# Patient Record
Sex: FEMALE | Race: WHITE | NOT HISPANIC OR LATINO | Employment: OTHER | URBAN - METROPOLITAN AREA
[De-identification: names, ages, dates, MRNs, and addresses within clinical notes are randomized per-mention and may not be internally consistent; named-entity substitution may affect disease eponyms.]

---

## 2023-12-14 ENCOUNTER — OFFICE VISIT (OUTPATIENT)
Dept: URGENT CARE | Facility: URGENT CARE | Age: 78
End: 2023-12-14
Payer: MEDICARE

## 2023-12-14 VITALS
DIASTOLIC BLOOD PRESSURE: 64 MMHG | WEIGHT: 113.6 LBS | SYSTOLIC BLOOD PRESSURE: 110 MMHG | HEART RATE: 64 BPM | OXYGEN SATURATION: 98 % | TEMPERATURE: 96.8 F

## 2023-12-14 DIAGNOSIS — J18.9 WALKING PNEUMONIA: Primary | ICD-10-CM

## 2023-12-14 PROCEDURE — 99203 OFFICE O/P NEW LOW 30 MIN: CPT | Performed by: FAMILY MEDICINE

## 2023-12-14 RX ORDER — TIOTROPIUM BROMIDE INHALATION SPRAY 1.56 UG/1
1 SPRAY, METERED RESPIRATORY (INHALATION) DAILY
COMMUNITY
Start: 2023-09-20

## 2023-12-14 RX ORDER — LEVOTHYROXINE SODIUM 88 MCG
88 TABLET ORAL
COMMUNITY
Start: 2023-04-27

## 2023-12-14 RX ORDER — DOXYCYCLINE 100 MG/1
100 CAPSULE ORAL 2 TIMES DAILY
Qty: 20 CAPSULE | Refills: 0 | Status: SHIPPED | OUTPATIENT
Start: 2023-12-14 | End: 2023-12-24

## 2023-12-14 RX ORDER — ALBUTEROL SULFATE 90 UG/1
1-2 AEROSOL, METERED RESPIRATORY (INHALATION) EVERY 4 HOURS PRN
Qty: 18 G | Refills: 0 | Status: SHIPPED | OUTPATIENT
Start: 2023-12-14

## 2023-12-14 NOTE — PROGRESS NOTES
"  ASSESSMENT/ PLAN:    Walking pneumonia     - doxycycline hyclate (VIBRAMYCIN) 100 MG capsule; Take 1 capsule (100 mg) by mouth 2 times daily for 10 days  - albuterol (PROAIR HFA/PROVENTIL HFA/VENTOLIN HFA) 108 (90 Base) MCG/ACT inhaler; Inhale 1-2 puffs into the lungs every 4 hours as needed for shortness of breath or wheezing       We discussed that the patient's symptoms most closely fit the pattern of a walking pneumonia or mycoplasma pneumonitis with a prolonged, low level respiratory infection involving both lungs. Not much fevers or chills,  Thick, tenacious pulmonary secretions that are difficult to clear with coughing.      We discussed that no antibiotic kills mycoplasma, and only certain antibiotics will slow the multiplication of the organism,      Use of an albuterol inhaler and OTC expectorant can be helpful to open the airways to help clear the thick secretions from the airways.       Symptomatic measures encouraged, humidified air, plenty of fluids.  Patient may consider OTC expectorant and/or cough suppressant to treat symptoms.  Return if worsening  --------------------------------------------------------------------------------------------------------------------------    SUBJECTIVE:  Chief Complaint   Patient presents with    Urgent Care      had bacterial pneumonia X1 month ago , patient got sick after with deep cough, was not seen in clinic for this, getting worse, dry cough, at night/evening worse, chest discomfort, \"spasm\"  Positive covid in October,   No meds tried      Kemi Wesley is a 78 year old female who presents to the clinic today with a chief complaint of cough  and lung congestion for 3 week(s).  Patient denies pleuritic chest pain and wheezing.  Her cough is described as persistent, daytime, nightime, and productive mucoid and tenacious.    The patient's symptoms are moderate and worsening.  Associated symptoms include malaise. The patient's symptoms are exacerbated " by no particular triggers  Patient has been using nothing  to improve symptoms.    No past medical history on file.  There is no problem list on file for this patient.      ALLERGIES:  Black pepper-turmeric, Influenza vaccines, Azithromycin, Ciprofloxacin, Dust mite extract, Dust mites, Fluconazole, Fluticasone, Fluticasone furoate, Fluticasone-salmeterol, Gabapentin, Hydrocodone, Levofloxacin, Metronidazole, Mold, Molds & smuts, Morphine, Nuts, Peanut-containing drug products, [place]per, and Latex    SPIRIVA RESPIMAT 1.25 MCG/ACT inhaler, Inhale 1 puff into the lungs daily  SYNTHROID 88 MCG tablet, Take 88 mcg by mouth    No current facility-administered medications on file prior to visit.      Social History     Tobacco Use    Smoking status: Not on file    Smokeless tobacco: Not on file   Substance Use Topics    Alcohol use: Not on file       No family history on file.      ROS  CONSTITUTIONAL:NEGATIVE for fever, chills,   INTEGUMENTARY/SKIN: NEGATIVE for worrisome rashes, or lesions  EYES: NEGATIVE for vision changes or irritation  ENT/MOUTH: NEGATIVE for ear, mouth and throat problems    OBJECTIVE:  /64   Pulse 64   Temp 96.8  F (36  C) (Oral)   Wt 51.5 kg (113 lb 9.6 oz)   SpO2 98%   GENERAL APPEARANCE: alert, moderate distress, and cooperative  EYES: EOMI,  PERRL, conjunctiva clear  HENT: ear canals and TM's normal.  Nose and mouth without ulcers, erythema or lesions  NECK: supple, nontender, no lymphadenopathy  RESP: lungs clear to auscultation - no rales, rhonchi or wheezes  CV: regular rates and rhythm, normal S1 S2, no murmur noted  NEURO: Normal strength and tone, sensory exam grossly normal,  normal speech and mentation  SKIN: no suspicious lesions or rashes

## 2025-02-24 ENCOUNTER — APPOINTMENT (OUTPATIENT)
Dept: MRI IMAGING | Facility: CLINIC | Age: 80
End: 2025-02-24
Attending: EMERGENCY MEDICINE
Payer: MEDICARE

## 2025-02-24 ENCOUNTER — APPOINTMENT (OUTPATIENT)
Dept: CT IMAGING | Facility: CLINIC | Age: 80
End: 2025-02-24
Attending: EMERGENCY MEDICINE
Payer: MEDICARE

## 2025-02-24 ENCOUNTER — HOSPITAL ENCOUNTER (OUTPATIENT)
Facility: CLINIC | Age: 80
Setting detail: OBSERVATION
Discharge: HOME | End: 2025-02-25
Attending: EMERGENCY MEDICINE | Admitting: INTERNAL MEDICINE
Payer: MEDICARE

## 2025-02-24 DIAGNOSIS — H54.61 VISION LOSS OF RIGHT EYE: Primary | ICD-10-CM

## 2025-02-24 DIAGNOSIS — G45.9 TIA (TRANSIENT ISCHEMIC ATTACK): ICD-10-CM

## 2025-02-24 LAB
ANION GAP SERPL CALCULATED.3IONS-SCNC: 13 MMOL/L (ref 7–15)
APTT PPP: 27 SECONDS (ref 22–38)
BASOPHILS # BLD AUTO: 0 10E3/UL (ref 0–0.2)
BASOPHILS NFR BLD AUTO: 1 %
BUN SERPL-MCNC: 15.1 MG/DL (ref 8–23)
CALCIUM SERPL-MCNC: 9.5 MG/DL (ref 8.8–10.4)
CHLORIDE SERPL-SCNC: 102 MMOL/L (ref 98–107)
CREAT SERPL-MCNC: 0.74 MG/DL (ref 0.51–0.95)
EGFRCR SERPLBLD CKD-EPI 2021: 81 ML/MIN/1.73M2
EOSINOPHIL # BLD AUTO: 0.2 10E3/UL (ref 0–0.7)
EOSINOPHIL NFR BLD AUTO: 2 %
ERYTHROCYTE [DISTWIDTH] IN BLOOD BY AUTOMATED COUNT: 12.5 % (ref 10–15)
EST. AVERAGE GLUCOSE BLD GHB EST-MCNC: 120 MG/DL
GLUCOSE BLDC GLUCOMTR-MCNC: 91 MG/DL (ref 70–99)
GLUCOSE SERPL-MCNC: 102 MG/DL (ref 70–99)
HBA1C MFR BLD: 5.8 %
HCO3 SERPL-SCNC: 25 MMOL/L (ref 22–29)
HCT VFR BLD AUTO: 42.2 % (ref 35–47)
HGB BLD-MCNC: 13.9 G/DL (ref 11.7–15.7)
IMM GRANULOCYTES # BLD: 0 10E3/UL
IMM GRANULOCYTES NFR BLD: 0 %
INR PPP: 0.97 (ref 0.85–1.15)
LYMPHOCYTES # BLD AUTO: 2.6 10E3/UL (ref 0.8–5.3)
LYMPHOCYTES NFR BLD AUTO: 30 %
MCH RBC QN AUTO: 30.3 PG (ref 26.5–33)
MCHC RBC AUTO-ENTMCNC: 32.9 G/DL (ref 31.5–36.5)
MCV RBC AUTO: 92 FL (ref 78–100)
MONOCYTES # BLD AUTO: 0.5 10E3/UL (ref 0–1.3)
MONOCYTES NFR BLD AUTO: 6 %
NEUTROPHILS # BLD AUTO: 5.2 10E3/UL (ref 1.6–8.3)
NEUTROPHILS NFR BLD AUTO: 61 %
NRBC # BLD AUTO: 0 10E3/UL
NRBC BLD AUTO-RTO: 0 /100
PLATELET # BLD AUTO: 220 10E3/UL (ref 150–450)
POTASSIUM SERPL-SCNC: 4.3 MMOL/L (ref 3.4–5.3)
RBC # BLD AUTO: 4.59 10E6/UL (ref 3.8–5.2)
SODIUM SERPL-SCNC: 140 MMOL/L (ref 135–145)
TROPONIN T SERPL HS-MCNC: <6 NG/L
WBC # BLD AUTO: 8.5 10E3/UL (ref 4–11)

## 2025-02-24 PROCEDURE — 99222 1ST HOSP IP/OBS MODERATE 55: CPT | Performed by: PHYSICIAN ASSISTANT

## 2025-02-24 PROCEDURE — A9585 GADOBUTROL INJECTION: HCPCS | Performed by: EMERGENCY MEDICINE

## 2025-02-24 PROCEDURE — 250N000013 HC RX MED GY IP 250 OP 250 PS 637: Performed by: EMERGENCY MEDICINE

## 2025-02-24 PROCEDURE — 82374 ASSAY BLOOD CARBON DIOXIDE: CPT | Performed by: EMERGENCY MEDICINE

## 2025-02-24 PROCEDURE — 82962 GLUCOSE BLOOD TEST: CPT | Mod: GZ

## 2025-02-24 PROCEDURE — 80061 LIPID PANEL: CPT | Performed by: PHYSICIAN ASSISTANT

## 2025-02-24 PROCEDURE — 83036 HEMOGLOBIN GLYCOSYLATED A1C: CPT | Performed by: PHYSICIAN ASSISTANT

## 2025-02-24 PROCEDURE — 250N000009 HC RX 250: Performed by: EMERGENCY MEDICINE

## 2025-02-24 PROCEDURE — 250N000011 HC RX IP 250 OP 636: Performed by: EMERGENCY MEDICINE

## 2025-02-24 PROCEDURE — 85730 THROMBOPLASTIN TIME PARTIAL: CPT | Performed by: EMERGENCY MEDICINE

## 2025-02-24 PROCEDURE — 80048 BASIC METABOLIC PNL TOTAL CA: CPT | Performed by: EMERGENCY MEDICINE

## 2025-02-24 PROCEDURE — 99285 EMERGENCY DEPT VISIT HI MDM: CPT | Mod: 25

## 2025-02-24 PROCEDURE — 70553 MRI BRAIN STEM W/O & W/DYE: CPT

## 2025-02-24 PROCEDURE — G0378 HOSPITAL OBSERVATION PER HR: HCPCS

## 2025-02-24 PROCEDURE — 93005 ELECTROCARDIOGRAM TRACING: CPT

## 2025-02-24 PROCEDURE — 84484 ASSAY OF TROPONIN QUANT: CPT | Performed by: EMERGENCY MEDICINE

## 2025-02-24 PROCEDURE — 70496 CT ANGIOGRAPHY HEAD: CPT

## 2025-02-24 PROCEDURE — 36415 COLL VENOUS BLD VENIPUNCTURE: CPT | Performed by: EMERGENCY MEDICINE

## 2025-02-24 PROCEDURE — 255N000002 HC RX 255 OP 636: Performed by: EMERGENCY MEDICINE

## 2025-02-24 PROCEDURE — 99207 PR NO BILLABLE SERVICE THIS VISIT: CPT

## 2025-02-24 PROCEDURE — 85610 PROTHROMBIN TIME: CPT | Performed by: EMERGENCY MEDICINE

## 2025-02-24 PROCEDURE — 70450 CT HEAD/BRAIN W/O DYE: CPT

## 2025-02-24 PROCEDURE — 85004 AUTOMATED DIFF WBC COUNT: CPT | Performed by: EMERGENCY MEDICINE

## 2025-02-24 RX ORDER — ONDANSETRON 2 MG/ML
4 INJECTION INTRAMUSCULAR; INTRAVENOUS EVERY 6 HOURS PRN
Status: DISCONTINUED | OUTPATIENT
Start: 2025-02-24 | End: 2025-02-25 | Stop reason: HOSPADM

## 2025-02-24 RX ORDER — IOPAMIDOL 755 MG/ML
500 INJECTION, SOLUTION INTRAVASCULAR ONCE
Status: COMPLETED | OUTPATIENT
Start: 2025-02-24 | End: 2025-02-24

## 2025-02-24 RX ORDER — ASPIRIN 325 MG
325 TABLET ORAL ONCE
Status: COMPLETED | OUTPATIENT
Start: 2025-02-24 | End: 2025-02-24

## 2025-02-24 RX ORDER — ACETAMINOPHEN 325 MG/1
650 TABLET ORAL EVERY 4 HOURS PRN
Status: DISCONTINUED | OUTPATIENT
Start: 2025-02-24 | End: 2025-02-25 | Stop reason: HOSPADM

## 2025-02-24 RX ORDER — ONDANSETRON 4 MG/1
4 TABLET, ORALLY DISINTEGRATING ORAL EVERY 6 HOURS PRN
Status: DISCONTINUED | OUTPATIENT
Start: 2025-02-24 | End: 2025-02-25 | Stop reason: HOSPADM

## 2025-02-24 RX ORDER — LEVOTHYROXINE SODIUM 88 UG/1
88 TABLET ORAL
Status: DISCONTINUED | OUTPATIENT
Start: 2025-02-25 | End: 2025-02-25 | Stop reason: HOSPADM

## 2025-02-24 RX ORDER — GADOBUTROL 604.72 MG/ML
6 INJECTION INTRAVENOUS ONCE
Status: COMPLETED | OUTPATIENT
Start: 2025-02-24 | End: 2025-02-24

## 2025-02-24 RX ORDER — MULTIVITAMIN
1 TABLET ORAL DAILY
COMMUNITY

## 2025-02-24 RX ADMIN — IOPAMIDOL 67 ML: 755 INJECTION, SOLUTION INTRAVENOUS at 16:30

## 2025-02-24 RX ADMIN — SODIUM CHLORIDE 80 ML: 9 INJECTION, SOLUTION INTRAVENOUS at 16:30

## 2025-02-24 RX ADMIN — GADOBUTROL 6 ML: 604.72 INJECTION INTRAVENOUS at 20:40

## 2025-02-24 RX ADMIN — ASPIRIN 325 MG ORAL TABLET 325 MG: 325 PILL ORAL at 18:01

## 2025-02-24 ASSESSMENT — COLUMBIA-SUICIDE SEVERITY RATING SCALE - C-SSRS
2. HAVE YOU ACTUALLY HAD ANY THOUGHTS OF KILLING YOURSELF IN THE PAST MONTH?: NO
1. IN THE PAST MONTH, HAVE YOU WISHED YOU WERE DEAD OR WISHED YOU COULD GO TO SLEEP AND NOT WAKE UP?: NO
6. HAVE YOU EVER DONE ANYTHING, STARTED TO DO ANYTHING, OR PREPARED TO DO ANYTHING TO END YOUR LIFE?: NO

## 2025-02-24 ASSESSMENT — ACTIVITIES OF DAILY LIVING (ADL)
ADLS_ACUITY_SCORE: 41
ADLS_ACUITY_SCORE: 51

## 2025-02-24 NOTE — ED TRIAGE NOTES
Pt states she was driving home from an appointment today around 11 am- stated her vision felt weird- about 30 mins pta- she had complete vision loss in right eye- stated she felt like there was a curtain, and went fully grey. Denies any other symtoms- Dr. Davis called to triage for stroke eval. VSS Not on thinners C/o headache- denies dizziness/lightheadedness     Triage Assessment (Adult)       Row Name 02/24/25 1607          Triage Assessment    Airway WDL WDL        Respiratory WDL    Respiratory WDL WDL        Cardiac WDL    Cardiac WDL WDL

## 2025-02-24 NOTE — PHARMACY-ADMISSION MEDICATION HISTORY
Pharmacy Intern Admission Medication History    Admission medication history is complete. The information provided in this note is only as accurate as the sources available at the time of the update.    Information Source(s): Patient and CareEverywhere/SureScripts via in-person    Pertinent Information: None    Changes made to PTA medication list:  Added: Vit D and multivitamin  Deleted: None  Changed: None    Allergies reviewed with patient and updates made in EHR: yes    Medication History Completed By: Aly Liz 2/24/2025 5:39 PM    PTA Med List   Medication Sig Last Dose/Taking    albuterol (PROAIR HFA/PROVENTIL HFA/VENTOLIN HFA) 108 (90 Base) MCG/ACT inhaler Inhale 1-2 puffs into the lungs every 4 hours as needed for shortness of breath or wheezing Taking As Needed    multivitamin w/minerals (MULTI-VITAMIN) tablet Take 1 tablet by mouth daily. 2/24/2025 Morning    SPIRIVA RESPIMAT 1.25 MCG/ACT inhaler Inhale 1 puff into the lungs daily 2/23/2025 Morning    SYNTHROID 88 MCG tablet Take 88 mcg by mouth every morning (before breakfast). 2/24/2025 Morning    VITAMIN D PO Take 1 capsule by mouth daily. 2/24/2025 Morning

## 2025-02-24 NOTE — ED PROVIDER NOTES
"  Emergency Department Note      History of Present Illness     Chief Complaint   Loss of Vision      HPI   Kemi Wesley is a 80 year old female with a history of osteoarthritis, Guillain-[place] syndrome, migraine, and squamous cell skin cancer who presents to the Emergency Department for loss of vision. The patient reports at 1100 the vision in her right eye began intermittently getting \"shady\". At 1530, she was standing talking to her  when she describes a curtain coming down in her right eye, although her vision did eventually return. At this time, she also had a headache. Of note, the patient endorses neck pain at baseline and had a chiropractic neck adjustment this morning (2/24/25). Denies numbness, weakness, tingling, chest pain, shortness of breath, vomiting, double vision, loss of consciousness, or recent falls.    Independent Historian   None    Review of External Notes   I reviewed the patient's 12/23/24 office visit note in which she had a normal wellness visit.     Past Medical History     Medical History and Problem List   Asthma   Anemia  Cataract   Cancer Skin Squamous Cell   GERD  Guillain-[place] syndrome   Hypothyroidism   Hyperlipidemia   Migraine   Osteoporosis   Osteoarthritis   Radiculopathy Cervical     Medications   Aspirin EC 81 MG   Biotin   Synthroid     Surgical History   D&C  Hysterectomy, supracervical     Physical Exam     Patient Vitals for the past 24 hrs:   BP Temp Temp src Pulse Resp SpO2 Height Weight   02/24/25 1600 (!) 148/86 98  F (36.7  C) Temporal 83 20 99 % 1.6 m (5' 3\") 57.6 kg (126 lb 15.8 oz)     Physical Exam  General: Resting on the bed.  Head: No obvious trauma to head.  Ears, Nose, Throat:  External ears normal.  Nose normal.    Eyes:  Conjunctivae clear.  Pupils are equal, round, and reactive.   Neck: Normal range of motion.  Neck supple.   CV: Regular rate and rhythm.  No murmurs.  2+ radial pulses     Respiratory: Effort normal and breath sounds normal.  " No wheezing or crackles.   Gastrointestinal: Soft.  No distension. There is no tenderness.  There is no rigidity, no rebound and no guarding.   Neuro: Alert. Moving all extremities appropriately.  Normal speech.  CN II-XII grossly intact, no pronator drift, normal finger-nose-finger, visual fields intact.  Gross muscle strength intact of the proximal and distal bilateral upper and lower extremities.  Sensation intact to light touch in all 4 extremities.  Normal gait.    Skin: Skin is warm and dry.  No rash noted.     Diagnostics     Lab Results   Labs Ordered and Resulted from Time of ED Arrival to Time of ED Departure   CBC WITH PLATELETS AND DIFFERENTIAL       Result Value    WBC Count 8.5      RBC Count 4.59      Hemoglobin 13.9      Hematocrit 42.2      MCV 92      MCH 30.3      MCHC 32.9      RDW 12.5      Platelet Count 220      % Neutrophils 61      % Lymphocytes 30      % Monocytes 6      % Eosinophils 2      % Basophils 1      % Immature Granulocytes 0      NRBCs per 100 WBC 0      Absolute Neutrophils 5.2      Absolute Lymphocytes 2.6      Absolute Monocytes 0.5      Absolute Eosinophils 0.2      Absolute Basophils 0.0      Absolute Immature Granulocytes 0.0      Absolute NRBCs 0.0     GLUCOSE MONITOR NURSING POCT   BASIC METABOLIC PANEL   INR   PARTIAL THROMBOPLASTIN TIME   TROPONIN T, HIGH SENSITIVITY       Imaging   MR Brain w/o & w Contrast   Final Result   IMPRESSION:   1.  No finding for intracranial hemorrhage, mass, or acute infarct.      2.  At least mild presumed sequela chronic microvascular ischemic change.      CTA Head Neck with Contrast   Final Result   IMPRESSION:    HEAD CT:   1.  No acute intracranial hemorrhage, mass effect, or evidence of an acute transcortical infarct.   2.  Mild presumed chronic microvascular ischemic change.      HEAD CTA:    1.  No large vessel occlusion, high-grade stenosis, aneurysm, or high-flow vascular malformation.      NECK CTA:   1.  No hemodynamically  significant stenosis or dissection in the neck vessels.   2.  Nonvascular findings, as described.      CT Head w/o Contrast   Final Result   IMPRESSION:    HEAD CT:   1.  No acute intracranial hemorrhage, mass effect, or evidence of an acute transcortical infarct.   2.  Mild presumed chronic microvascular ischemic change.      HEAD CTA:    1.  No large vessel occlusion, high-grade stenosis, aneurysm, or high-flow vascular malformation.      NECK CTA:   1.  No hemodynamically significant stenosis or dissection in the neck vessels.   2.  Nonvascular findings, as described.      Echocardiogram Complete    (Results Pending)       EKG   ECG results from 02/24/25   EKG 12-lead, tracing only     Value    Systolic Blood Pressure     Diastolic Blood Pressure     Ventricular Rate 73    Atrial Rate 73    AL Interval 130    QRS Duration 90        QTc 464    P Axis 10    R AXIS 10    T Axis 35    Interpretation ECG      Sinus rhythm with sinus arrhythmia  Normal ECG  No previous ECGs available  Independent interpretation done by me at 1702.      Independent Interpretation   CT Head: No intracranial hemorrhage.    ED Course      Medications Administered   Medications   CT Scan Flush (80 mLs Intravenous $Given 2/24/25 1630)   iopamidol (ISOVUE-370) solution 500 mL (67 mLs Intravenous $Given 2/24/25 1630)       Procedures   None     Discussion of Management   Admitting Hospitalist, Dr. Marsh  Stroke neurology, Dr. Lopez    ED Course   ED Course as of 02/24/25 1933   Mon Feb 24, 2025   1604 I evaluated the patient, obtained history, and performed a physical exam as detailed above.    1619 I spoke with stroke neurology.    1902 I updated the patient.  She is agreeable with the plan.   1904 I spoke with the hospitalist Rosario Wayne with Dr. Bailey       Additional Documentation  None    Medical Decision Making / Diagnosis     CMS Diagnoses: None    MIPS       None    MDM   Kemi Wesley is a 80 year old female who  presents for evaluation of vision changes.  No signs show mild hypertension that did not improve over the course her stay.  This is consistent with likely transient ischemic attack. Detailed neurologic exam here in ED shows complete resolution of symptoms.  Imaging as noted above.  Differential considered for symptoms included CVA, TIA, dissection, cerebral tumor, migraine, infection, metabolic derangement, demyelination, etc. patient is not a TNK candidate as patient's symptoms have resolved.  EKG shows sinus rhythm without acute ischemic changes, no atrial fibrillation nor arrhythmia noted on telemetry monitoring.  CBC without leukocytosis or anemia.  BMP without acute electrolyte, metabolic or renal dysfunction.  Troponin is undetectable.  Coags are normal.  Stat CT CTA was obtained to rule out dissection.  Fortunately there is no evidence of dissection, severe stenosis, bleed.  Neurology recommended MRI and aspirin.  Patient was admitted to the hospitalist along with neurology consultation for concern for TIA versus stroke versus amaurosis fugax.        Disposition   The patient was admitted to the hospital.     Diagnosis     ICD-10-CM    1. TIA (transient ischemic attack)  G45.9            Discharge Medications   New Prescriptions    No medications on file     Scribe Disclosure:  I, Sita Almanzar, am serving as a scribe at 4:40 PM on 2/24/2025 to document services personally performed by Evelyn Johnson MD based on my observations and the provider's statements to me.        Evelyn Johnson MD  02/24/25 2486

## 2025-02-24 NOTE — CONSULTS
"[facility]    Stroke Telephone Note    I was called by Dr. Evelyn Johnson on 02/24/25 regarding patient Kemi Wesley. The patient is a 80 year old female with a PMH significant for depression and asthma who presents today with new right eye vision changes.  History obtained from ED provider.  Kemi underwent chiropractic neck manipulation this morning and when driving home she noted new \"shady\" vision loss/disturbance that would come and go in her right eye that then resolved.  Then approximately 30 minutes prior to arrival in the ED she had sudden complete painless right eye vision loss.  Her right eye vision has now returned.  On ED providers examination, no visual field cut appreciated, pupils reactive, no weakness or numbness.  NIH 0.  Patient does endorse right neck pain, right ear pain, left-sided headache.  Presenting blood pressure 148/86.  Not on aspirin or statin prior to arrival    Vitals  BP: (!) 148/86   Pulse: 83   Resp: 20   Temp: 98  F (36.7  C)   Weight: 57.6 kg (126 lb 15.8 oz)    Imaging Findings  CT head: No evidence of hemorrhage.  CTA head/neck: No LVO    Impression  Sudden right eye vision loss, now resolved.  Concern for amaurosis fugax versus acute ischemic stroke    Recent chiropractor manipulation, CTA head and neck negative for dissection.     Recommendations  - Recommend admission for TIA/Stroke Admission   - Please place consult stroke/telestroke team   - Neurochecks and Vital Signs every 4 hours   - Statin: high intensity statin pending LDL panel  - Recommend Aspirin 325 mg x 1 dose   - MRI Brain with and without contrast   - TTE (with Bubble Study if age 60 yrs or less)  - 24-hour Telemetry  - Bedside Glucose Monitoring  - Nutrition: No report of facial droop or dysarthria okay for bedside nursing swallow study.   - A1c, Lipid Panel  - PT/OT/SLP  - Stroke Education  - Euthermia, Euglycemia  - Recommend lifetime avoidance of chiropractor neck " "manipulation    Case discussed with vascular neurology attending Dr. Kelsey.    My recommendations are based on the information provided over the phone by Kemi Wesley's in-person providers. They are not intended to replace the clinical judgment of her in-person providers. I was not requested to personally see or examine the patient at this time.     Sita Hoskins PA-C  Vascular Neurology    To page me or covering stroke neurology team member, click here: [facility]  Choose \"On Call\" tab at top, then select \"NEUROLOGY/ALL SITES\" from middle drop-down box, press Enter, then look for \"stroke\" or \"telestroke\" for your site.   "

## 2025-02-25 ENCOUNTER — TELEPHONE (OUTPATIENT)
Dept: OPHTHALMOLOGY | Facility: CLINIC | Age: 80
End: 2025-02-25

## 2025-02-25 ENCOUNTER — ORDERS ONLY (AUTO-RELEASED) (OUTPATIENT)
Dept: MEDSURG UNIT | Facility: CLINIC | Age: 80
End: 2025-02-25

## 2025-02-25 VITALS
HEIGHT: 63 IN | HEART RATE: 80 BPM | TEMPERATURE: 98.5 F | RESPIRATION RATE: 16 BRPM | WEIGHT: 123.2 LBS | SYSTOLIC BLOOD PRESSURE: 142 MMHG | DIASTOLIC BLOOD PRESSURE: 84 MMHG | OXYGEN SATURATION: 96 % | BODY MASS INDEX: 21.83 KG/M2

## 2025-02-25 DIAGNOSIS — G45.9 TIA (TRANSIENT ISCHEMIC ATTACK): ICD-10-CM

## 2025-02-25 LAB
ATRIAL RATE - MUSE: 73 BPM
CHOLEST SERPL-MCNC: 255 MG/DL
DIASTOLIC BLOOD PRESSURE - MUSE: NORMAL MMHG
GLUCOSE BLDC GLUCOMTR-MCNC: 108 MG/DL (ref 70–99)
HDLC SERPL-MCNC: 78 MG/DL
INTERPRETATION ECG - MUSE: NORMAL
LDLC SERPL CALC-MCNC: 154 MG/DL
LVEF ECHO: NORMAL
NONHDLC SERPL-MCNC: 177 MG/DL
P AXIS - MUSE: 10 DEGREES
PR INTERVAL - MUSE: 130 MS
QRS DURATION - MUSE: 90 MS
QT - MUSE: 422 MS
QTC - MUSE: 464 MS
R AXIS - MUSE: 10 DEGREES
SYSTOLIC BLOOD PRESSURE - MUSE: NORMAL MMHG
T AXIS - MUSE: 35 DEGREES
TRIGL SERPL-MCNC: 116 MG/DL
VENTRICULAR RATE- MUSE: 73 BPM

## 2025-02-25 PROCEDURE — 250N000013 HC RX MED GY IP 250 OP 250 PS 637

## 2025-02-25 PROCEDURE — 99238 HOSP IP/OBS DSCHRG MGMT 30/<: CPT

## 2025-02-25 PROCEDURE — G0426 INPT/ED TELECONSULT50: HCPCS | Mod: G0 | Performed by: PHYSICIAN ASSISTANT

## 2025-02-25 PROCEDURE — 250N000013 HC RX MED GY IP 250 OP 250 PS 637: Performed by: INTERNAL MEDICINE

## 2025-02-25 PROCEDURE — 82962 GLUCOSE BLOOD TEST: CPT

## 2025-02-25 PROCEDURE — 250N000013 HC RX MED GY IP 250 OP 250 PS 637: Performed by: PHYSICIAN ASSISTANT

## 2025-02-25 PROCEDURE — G0378 HOSPITAL OBSERVATION PER HR: HCPCS

## 2025-02-25 RX ORDER — CLOPIDOGREL BISULFATE 75 MG/1
75 TABLET ORAL DAILY
Qty: 21 TABLET | Refills: 0 | Status: SHIPPED | OUTPATIENT
Start: 2025-02-26 | End: 2025-03-19

## 2025-02-25 RX ORDER — CLOPIDOGREL BISULFATE 75 MG/1
75 TABLET ORAL DAILY
Status: DISCONTINUED | OUTPATIENT
Start: 2025-02-26 | End: 2025-02-25 | Stop reason: HOSPADM

## 2025-02-25 RX ORDER — ASPIRIN 81 MG/1
81 TABLET, CHEWABLE ORAL DAILY
Status: DISCONTINUED | OUTPATIENT
Start: 2025-02-26 | End: 2025-02-25

## 2025-02-25 RX ORDER — IBUPROFEN 200 MG
200 TABLET ORAL ONCE
Status: COMPLETED | OUTPATIENT
Start: 2025-02-25 | End: 2025-02-25

## 2025-02-25 RX ORDER — ASPIRIN 81 MG/1
81 TABLET, CHEWABLE ORAL DAILY
Qty: 30 TABLET | Refills: 1 | Status: SHIPPED | OUTPATIENT
Start: 2025-02-26

## 2025-02-25 RX ORDER — ASPIRIN 81 MG/1
81 TABLET, CHEWABLE ORAL DAILY
Status: DISCONTINUED | OUTPATIENT
Start: 2025-02-25 | End: 2025-02-25 | Stop reason: HOSPADM

## 2025-02-25 RX ORDER — ATORVASTATIN CALCIUM 40 MG/1
40 TABLET, FILM COATED ORAL DAILY
Qty: 30 TABLET | Refills: 1 | Status: SHIPPED | OUTPATIENT
Start: 2025-02-25

## 2025-02-25 RX ORDER — CLOPIDOGREL BISULFATE 75 MG/1
300 TABLET ORAL ONCE
Status: COMPLETED | OUTPATIENT
Start: 2025-02-25 | End: 2025-02-25

## 2025-02-25 RX ADMIN — ASPIRIN 81 MG CHEWABLE TABLET 81 MG: 81 TABLET CHEWABLE at 14:50

## 2025-02-25 RX ADMIN — IBUPROFEN 200 MG: 200 TABLET, FILM COATED ORAL at 04:08

## 2025-02-25 RX ADMIN — CLOPIDOGREL BISULFATE 300 MG: 75 TABLET ORAL at 14:49

## 2025-02-25 RX ADMIN — LEVOTHYROXINE SODIUM 88 MCG: 0.09 TABLET ORAL at 07:24

## 2025-02-25 ASSESSMENT — ACTIVITIES OF DAILY LIVING (ADL)
ADLS_ACUITY_SCORE: 27
ADLS_ACUITY_SCORE: 28
ADLS_ACUITY_SCORE: 28
ADLS_ACUITY_SCORE: 27
ADLS_ACUITY_SCORE: 27
ADLS_ACUITY_SCORE: 28
ADLS_ACUITY_SCORE: 28
ADLS_ACUITY_SCORE: 27
ADLS_ACUITY_SCORE: 28
ADLS_ACUITY_SCORE: 27

## 2025-02-25 NOTE — PLAN OF CARE
PRIMARY DIAGNOSIS: LOSS OF VISION  OUTPATIENT/OBSERVATION GOALS TO BE MET BEFORE DISCHARGE:  ADLs back to baseline: Yes    Activity and level of assistance: Up with standby assistance.    Pain status: Improved with use of alternative comfort measures i.e.: positioning    Return to near baseline physical activity: Yes     Discharge Planner Nurse   Safe discharge environment identified: Yes  Barriers to discharge: Yes       Entered by: Rika Najera RN 02/25/2025 12:44 AM     Patient is alert and oriented x4. Room air. Reports neck pain, pillow support provided. Denies vision loss, blurry vision, nausea, dizziness, sob. Peripheral iv saline locked. Tele on SR 60s. Regular diet. Neuro checks q4, no changes. Daily weight. Standby assist. MRI done. Stroke Neurology consult pending.    Please review provider order for any additional goals.   Nurse to notify provider when observation goals have been met and patient is ready for discharge.    Goal Outcome Evaluation:      Plan of Care Reviewed With: patient    Overall Patient Progress: improvingOverall Patient Progress: improving

## 2025-02-25 NOTE — PLAN OF CARE
"PRIMARY DIAGNOSIS: TIA  OUTPATIENT/OBSERVATION GOALS TO BE MET BEFORE DISCHARGE:  1. Orthostatic performed: No    2. Diagnostic testing complete & at baseline neurologic testing: Yes    3. Cleared by consultants (if involved): No    4. Interpretation of cardiac rhythm per telemetry tech: SR 70's    5. Tolerating adequate PO diet and medications: Yes    6. Return to near baseline physical activity or neurologic status: Yes    Discharge Planner Nurse   Safe discharge environment identified: Yes  Barriers to discharge: Yes       Entered by: Faviola Matos RN 02/25/2025 7:36 AM     Please review provider order for any additional goals.   Nurse to notify provider when observation goals have been met and patient is ready for discharge.  Patient is alert and oriented x4. VS WNL and documented on the FS. Lung sounds clear and patient is on RA. Denies SOB. Active bowel sounds with LBM yesterday. Patient denies any pain, urgency, and frequency when voiding. Patient having some back pain and rating it a 1/10 and declined any intervention. Neuro's all WNL. All symptoms resolved. SL. Regular diet. Independent in room.   Plan: Stroke neurology   /74 (BP Location: Left arm, Patient Position: Supine, Cuff Size: Adult Small)   Pulse 69   Temp 98.2  F (36.8  C) (Oral)   Resp 16   Ht 1.6 m (5' 3\")   Wt 55.9 kg (123 lb 3.2 oz)   SpO2 94%   BMI 21.82 kg/m      Problem: Adult Inpatient Plan of Care  Goal: Plan of Care Review  Description: The Plan of Care Review/Shift note should be completed every shift.  The Outcome Evaluation is a brief statement about your assessment that the patient is improving, declining, or no change.  This information will be displayed automatically on your shift  note.  Outcome: Progressing  Flowsheets (Taken 2/25/2025 3030)  Plan of Care Reviewed With: patient  Overall Patient Progress: improving  Goal: Patient-Specific Goal (Individualized)  Description: You can add care plan individualizations to a " "care plan. Examples of Individualization might be:  \"Parent requests to be called daily at 9am for status\", \"I have a hard time hearing out of my right ear\", or \"Do not touch me to wake me up as it startles  me\".  Outcome: Progressing  Goal: Absence of Hospital-Acquired Illness or Injury  Outcome: Progressing  Intervention: Identify and Manage Fall Risk  Recent Flowsheet Documentation  Taken 2/25/2025 0720 by Faviola Matos, RN  Safety Promotion/Fall Prevention:   assistive device/personal items within reach   clutter free environment maintained   nonskid shoes/slippers when out of bed   patient and family education   safety round/check completed   room organization consistent  Intervention: Prevent Skin Injury  Recent Flowsheet Documentation  Taken 2/25/2025 0720 by Faviola Matos RN  Body Position: position changed independently  Goal: Optimal Comfort and Wellbeing  Outcome: Progressing  Intervention: Monitor Pain and Promote Comfort  Recent Flowsheet Documentation  Taken 2/25/2025 0720 by Faviola Matos, RN  Pain Management Interventions: declines  Goal: Readiness for Transition of Care  Outcome: Progressing   Goal Outcome Evaluation:      Plan of Care Reviewed With: patient    Overall Patient Progress: improvingOverall Patient Progress: improving           "

## 2025-02-25 NOTE — PLAN OF CARE
ROOM # 213-2    Living Situation (if not independent, order SW consult): Lives with spouse  Facility name:  : Spouse: Lenin ()  Son: Lester ()    Activity level at baseline: Independent  Activity level on admit: Standby    Who will be transporting you at discharge: TBD    Patient registered to observation; given Patient Bill of Rights; given the opportunity to ask questions about observation status and their plan of care.  Patient has been oriented to the observation room, bathroom and call light is in place.    Discussed discharge goals and expectations with patient/family.

## 2025-02-25 NOTE — PLAN OF CARE
"Patient's After Visit Summary was reviewed with patient and/or spouse.   Patient verbalized understanding of After Visit Summary, recommended follow up and was given an opportunity to ask questions.   Discharge medications sent home with patient/family: see discharge note    Discharged with spouse with all belongings. Patient medically cleared to discharge. Sons will transport home.   BP (!) 142/84 (BP Location: Left arm)   Pulse 80   Temp 98.5  F (36.9  C)   Resp 16   Ht 1.6 m (5' 3\")   Wt 55.9 kg (123 lb 3.2 oz)   SpO2 96%   BMI 21.82 kg/m          Problem: Adult Inpatient Plan of Care  Goal: Plan of Care Review  Description: The Plan of Care Review/Shift note should be completed every shift.  The Outcome Evaluation is a brief statement about your assessment that the patient is improving, declining, or no change.  This information will be displayed automatically on your shift  note.  2/25/2025 1453 by Faviola Matos RN  Outcome: Met  2/25/2025 1425 by Faviola Matos RN  Outcome: Progressing  Flowsheets (Taken 2/25/2025 1425)  Plan of Care Reviewed With: patient  Overall Patient Progress: improving  2/25/2025 0735 by Faviola Matos RN  Outcome: Progressing  Flowsheets (Taken 2/25/2025 0735)  Plan of Care Reviewed With: patient  Overall Patient Progress: improving  Goal: Patient-Specific Goal (Individualized)  Description: You can add care plan individualizations to a care plan. Examples of Individualization might be:  \"Parent requests to be called daily at 9am for status\", \"I have a hard time hearing out of my right ear\", or \"Do not touch me to wake me up as it startles  me\".  2/25/2025 1453 by Faviola Matos, JOYCE  Outcome: Met  2/25/2025 1425 by Faviola Matos RN  Outcome: Progressing  2/25/2025 0735 by Faviola Matos RN  Outcome: Progressing  Goal: Absence of Hospital-Acquired Illness or Injury  2/25/2025 1453 by Faviola Matos, RN  Outcome: Met  2/25/2025 1425 by Faviola Matos, RN  Outcome: Progressing  2/25/2025 0735 by " Carlo, Gardner, RN  Outcome: Progressing  Intervention: Identify and Manage Fall Risk  Recent Flowsheet Documentation  Taken 2/25/2025 0720 by Faviola Matos RN  Safety Promotion/Fall Prevention:   assistive device/personal items within reach   clutter free environment maintained   nonskid shoes/slippers when out of bed   patient and family education   safety round/check completed   room organization consistent  Intervention: Prevent Skin Injury  Recent Flowsheet Documentation  Taken 2/25/2025 0720 by Faviola Matos RN  Body Position: position changed independently  Goal: Optimal Comfort and Wellbeing  2/25/2025 1453 by Faviola Matos RN  Outcome: Met  2/25/2025 1425 by Faviola Matos RN  Outcome: Progressing  2/25/2025 0735 by Faviola Matos RN  Outcome: Progressing  Intervention: Monitor Pain and Promote Comfort  Recent Flowsheet Documentation  Taken 2/25/2025 1240 by Faviola Matos RN  Pain Management Interventions: declines  Taken 2/25/2025 0720 by Faviola Matos RN  Pain Management Interventions: declines  Goal: Readiness for Transition of Care  2/25/2025 1453 by Faviola Matos RN  Outcome: Met  2/25/2025 1425 by Faviola Matos RN  Outcome: Progressing  2/25/2025 0735 by Faviola Matos RN  Outcome: Progressing   Goal Outcome Evaluation:      Plan of Care Reviewed With: patient    Overall Patient Progress: improvingOverall Patient Progress: improving

## 2025-02-25 NOTE — PLAN OF CARE
"Patient is alert and oriented x4. VS WNL and documented on the FS. Lung sounds clear and patient is on RA. Denies SOB. Active bowel sounds with LBM yesterday. Patient denies any pain, urgency, and frequency when voiding. Patient having some back pain and rating it a 1/10 and declined any intervention. Neuro's all WNL. All symptoms resolved. SL. Regular diet. Independent in room.   Plan: - Load with Plavix 300mg; continue Plavix 75 mg and aspirin 81mg daily together for 21 days; then aspirin 81mg alone indefinitely  -Recommend high intensity statin such as Lipitor 40 mg for goal LDL 40-70  -Outpatient Zio patch monitor for 14 days  -Outpatient ophthalmology evaluation within a week  -Long-term good control of blood pressure as needed, although it sounds like she has no history of hypertension  -Educated patient about other stroke warning signs and that she should come back to the ER  Discharge home later today.   BP (!) 142/84 (BP Location: Left arm)   Pulse 80   Temp 98.5  F (36.9  C)   Resp 16   Ht 1.6 m (5' 3\")   Wt 55.9 kg (123 lb 3.2 oz)   SpO2 96%   BMI 21.82 kg/m      Problem: Adult Inpatient Plan of Care  Goal: Plan of Care Review  Description: The Plan of Care Review/Shift note should be completed every shift.  The Outcome Evaluation is a brief statement about your assessment that the patient is improving, declining, or no change.  This information will be displayed automatically on your shift  note.  2/25/2025 1425 by Faviola Matos, RN  Outcome: Progressing  Flowsheets (Taken 2/25/2025 1425)  Plan of Care Reviewed With: patient  Overall Patient Progress: improving  2/25/2025 0735 by Faviola Matos, RN  Outcome: Progressing  Flowsheets (Taken 2/25/2025 0735)  Plan of Care Reviewed With: patient  Overall Patient Progress: improving  Goal: Patient-Specific Goal (Individualized)  Description: You can add care plan individualizations to a care plan. Examples of Individualization might be:  \"Parent requests to " "be called daily at 9am for status\", \"I have a hard time hearing out of my right ear\", or \"Do not touch me to wake me up as it startles  me\".  2/25/2025 1425 by Faviola Matos RN  Outcome: Progressing  2/25/2025 0735 by Faviola Matos RN  Outcome: Progressing  Goal: Absence of Hospital-Acquired Illness or Injury  2/25/2025 1425 by Faviola Matso RN  Outcome: Progressing  2/25/2025 0735 by Faviola Matos RN  Outcome: Progressing  Intervention: Identify and Manage Fall Risk  Recent Flowsheet Documentation  Taken 2/25/2025 0720 by Faviola Matos RN  Safety Promotion/Fall Prevention:   assistive device/personal items within reach   clutter free environment maintained   nonskid shoes/slippers when out of bed   patient and family education   safety round/check completed   room organization consistent  Intervention: Prevent Skin Injury  Recent Flowsheet Documentation  Taken 2/25/2025 0720 by Faviola Matos RN  Body Position: position changed independently  Goal: Optimal Comfort and Wellbeing  2/25/2025 1425 by Faviola Matos RN  Outcome: Progressing  2/25/2025 0735 by Faviola Matos RN  Outcome: Progressing  Intervention: Monitor Pain and Promote Comfort  Recent Flowsheet Documentation  Taken 2/25/2025 1240 by Faviola Matos RN  Pain Management Interventions: declines  Taken 2/25/2025 0720 by Faviola Matos RN  Pain Management Interventions: declines  Goal: Readiness for Transition of Care  2/25/2025 1425 by Faviola Matos RN  Outcome: Progressing  2/25/2025 0735 by Faviola Matos RN  Outcome: Progressing   Goal Outcome Evaluation:      Plan of Care Reviewed With: patient    Overall Patient Progress: improvingOverall Patient Progress: improving           "

## 2025-02-25 NOTE — PLAN OF CARE
PRIMARY DIAGNOSIS: LOSS OF VISION   OUTPATIENT/OBSERVATION GOALS TO BE MET BEFORE DISCHARGE:  ADLs back to baseline: Yes    Activity and level of assistance: Up with standby assistance.    Pain status: Improved-controlled with oral pain medications.    Return to near baseline physical activity: Yes     Discharge Planner Nurse   Safe discharge environment identified: Yes  Barriers to discharge: Yes       Entered by: Rika Najera RN 02/25/2025 4:17 AM   Patient is alert and oriented x4. Room air. Reports headache, administered ibuprofen, pt said tylenol make her feel sick. Denies vision loss, blurry vision, nausea, dizziness, sob. Peripheral iv saline locked. Tele on SB 50s. Regular diet. Neuro checks q4, no changes. Daily weight. Standby assist. MRI done. Stroke Neurology consult pending.     Please review provider order for any additional goals.   Nurse to notify provider when observation goals have been met and patient is ready for discharge.    Goal Outcome Evaluation:      Plan of Care Reviewed With: patient    Overall Patient Progress: improvingOverall Patient Progress: improving

## 2025-02-25 NOTE — CONSULTS
"  [facility]     Vascular Neurology Consult            Assessment and Plan     Visual symptoms concerning for right-sided amaurosis fugax.  Discussed with the patient that we will workup and treat this the same as transient ischemic attack.  - Load with Plavix 300mg; continue Plavix 75 mg and aspirin 81mg daily together for 21 days; then aspirin 81mg alone indefinitely  -Recommend high intensity statin such as Lipitor 40 mg for goal LDL 40-70  -Outpatient Zio patch monitor for 14 days  -Outpatient ophthalmology evaluation within a week  -Long-term good control of blood pressure as needed, although it sounds like she has no history of hypertension  -Educated patient about other stroke warning signs and that she should come back to the ER       Patient Follow-up    - in 4-8 weeks with any stroke ISABEL ([phone])    No further stroke evaluation is recommended, so we will sign off. Please contact us with any additional questions.       Kirstin Perdomo PA-C  Vascular Neurology    To page me or covering stroke neurology team member, click here: [facility]  Choose \"On Call\" tab at top, then select \"NEUROLOGY/ALL SITES\" from middle drop-down box, press Enter, then look for \"stroke\" or \"telestroke\" for your site.           History of Present Illness   Chief Complaint: Loss of Vision      80 year old R handed woman who presented to the ER because of the visual symptoms that she had yesterday.  She reports that she was having a normal day, and she was driving.  All of a sudden it felt like her vision was off and she was looking \" through gray smoke\".  She did not really test to see if it was 1 eye or both because she was driving, but it completely resolved after about 3 to 4 minutes.    She got home, and then maybe an hour later, she felt like a black curtain was coming over her whole right eye, she thinks it came nasally to laterally as well as top to bottom, but was patchy.  The left eye was completely " normal.  Ultimately once that curtain came down she had a sparkly area in the upper right quadrant of the right eye and 1 pinhole with preserved vision in the bottom right corner of the right eye.  About 4 to 5 minutes later, so slightly longer than the initial episode, it started to dissipate in the opposite order that it came in.    Headache ever since then, little bit unusual for her. Has moved around and is now frontal and mild, kind of a dull ache.    She has a history of migraine with visual aura, but she notes that that visual auras very different from the visual symptoms that she experienced yesterday.  When she has the visual aura she sees little black spots with appearance of glass shards across her vision.  Used to get true severe migraines as a young woman.    Today she feels pretty well aside from the headache.  There were no other associated neurologic symptoms along with her visual problem.         PMH/PSH     Osteoporosis, migraine (severe as a young woman, only visual aura rarely nowadays with mild prodrome of tiredness for a day), hypothroid, ashtma, osteoarthitis, no known ocular or neurologic history.           Social History     Social History     Tobacco Use    Smoking status: Not on file    Smokeless tobacco: Not on file   Substance Use Topics    Alcohol use: Not on file              Physical Examination     Temp: 98.2  F (36.8  C) Temp src: Oral BP: 116/74 Pulse: 69   Resp: 16 SpO2: 94 % O2 Device: None (Room air)      General:  patient lying in bed without any acute distress    HEENT:  normocephalic/atraumatic  Pulmonary:  no respiratory distress    Neurologic  Mental Status:  alert, oriented x 3, follows commands, speech clear and fluent, naming and repetition normal  Cranial Nerves:  visual fields intact (tested by nurse), EOMI with normal smooth pursuit, facial sensation intact and symmetric (tested by nurse), facial movements symmetric, hearing not formally tested but intact to  conversation, no dysarthria, shoulder shrug equal bilaterally, tongue protrusion midline  Motor:  no abnormal movements, able to move all limbs antigravity spontaneously with no signs of hemiparesis observed, no pronator drift  Reflexes:  unable to test (telestroke)  Sensory:  light touch sensation intact and symmetric throughout upper and lower extremities (assessed by nurse), no extinction on double simultaneous stimulation (assessed by nurse)  Coordination:  normal finger-to-nose and heel-to-shin bilaterally without dysmetria, rapid alternating movements symmetric  Station/Gait:  unable to test due to telestroke        Stroke Scales         NIHSS  1a. Level of Consciousness 0-->Alert, keenly responsive   1b. LOC Questions 0-->Answers both questions correctly   1c. LOC Commands 0-->Performs both tasks correctly   2.   Best Gaze 0-->Normal   3.   Visual 0-->No visual loss   4.   Facial Palsy 0-->Normal symmetrical movements   5a. Motor Arm, Left 0-->No drift, limb holds 90 (or 45) degrees for full 10 secs   5b. Motor Arm, Right 0-->No drift, limb holds 90 (or 45) degrees for full 10 secs   6a. Motor Leg, Left 0-->No drift, leg holds 30 degree position for full 5 secs   6b. Motor Leg, right 0-->No drift, leg holds 30 degree position for full 5 secs   7.   Limb Ataxia 0-->Absent   8.   Sensory 0-->Normal, no sensory loss   9.   Best Language 0-->No aphasia, normal   10. Dysarthria 0-->Normal   11. Extinction and Inattention  0-->No abnormality   Total 0 (02/25/25 1107)           Imaging/Labs   (Bolded labs and imaging personally reviewed or re-reviewed by me today)    MRI/Head CT MRI brain shows no acute finding   Intracranial Vasculature CTA head normal   Cervical Vasculature CTA neck normal     Echocardiogram EF 55%, no cardiac source of embolism   EKG/Telemetry SR   Other Testing Not Applicable      LDL  2/24/2025: 154 mg/dL   A1C  2/24/2025: 5.8 %   Troponin 2/24/2025: <6 ng/L     Other labs I reviewed  today:  Glucose 108, HDL good at 78, wbc 8.5    Data   Telestroke Service Details  (for non-emergent stroke consult with tele)  Video start time 02/25/25 1055   Video end time 02/25/25 1123        Type of service telemedicine diagnostic assessment of acute neurological changes   Reason telemedicine is appropriate patient requires assessment with a specialist for diagnosis and treatment of neurological symptoms   Mode of transmission secure interactive audio and video communication per Antonella   Originating site (patient location) [facility]   Distant site (provider location) [facility]       Clinically Significant Risk Factors Present on Admission                                         Medical Decision Making      This was a moderate complexity visit based on the detailed history obtained, detailed physical examination performed by me and high complexity decision making.  I have personally spent a total of 60 minutes providing care today, time spent in reviewing medical records and devising the plan as recorded above.

## 2025-02-25 NOTE — PLAN OF CARE
[facility]    ED Boarding Nurse Handoff Addendum Report:    Date/time: 2/24/2025, 8:59 PM    Activity Level: standby    Fall Risk: Yes:  nonskid shoes/slippers when out of bed and patient and family education    Active Infusions: none    Current Meds Due: none    Current care needs: none    Oxygen requirements (liters/min and/or FiO2): none    Respiratory status: Room air    Vital signs (within last 30 minutes):    Vitals:    02/24/25 1805 02/24/25 1930 02/1945 02/24/25 2003   BP: (!) 171/94 (!) 159/93 (!) 154/84 (!) 153/81   Pulse: 75 69 73    Resp:    18   Temp:    98.4  F (36.9  C)   TempSrc:    Oral   SpO2: 99% 97% 97% 95%   Weight:       Height:           Focused assessment within last 30 minutes:    A&Ox4. Mild headache. Tele. MRI brain complete. Neuro checks q4hr, neuros intact. Denies vision loss. Blood sugar check 4x daily, completed- 91.    ED Boarding Nurse name: Manuel Bourne RN

## 2025-02-25 NOTE — TELEPHONE ENCOUNTER
[facility] Call Center    Phone Message    May a detailed message be left on voicemail: yes     Reason for Call: Appointment Intake    Referring Provider Name: Katalina Wayne PA-C       Diagnosis and/or Symptoms: TIA (transient ischemic attack) Vision loss of right eye , Visual symptoms concerning for right-sided amaurosis fugax     Pt would like to schedule, please review, this is an urgent referral    Action Taken: Message routed to:  [facility] eye    Travel Screening: Not Applicable     Date of Service:

## 2025-02-25 NOTE — H&P
"[facility]  Internal Medicine  History and Physical      Patient Name: Kemi Wesley MRN# [id number]   Age: 80 year old YOB: 1945     Date of Admission:2/24/2025    Primary care provider: John Truong  Date of Service: 2/24/2025         Assessment and Plan:   Kemi Wesley is a 80 year old female with a history of Skin Cancer, Osteoporosis, Cervical Radiculopathy, Migraine HA, Hypothyroidism, Asthma, GERD, OA who presents to the ED today due to right eye vision loss.    Patient reports she went a chiropractor appointment this morning where he worked on her neck and back.  On her drive home around 11am, she developed shadows of light grey in her right eye that slowly faded away and her vision returned to normal.  Around 3pm, she was in her home when she had a sudden onset of right eye vision loss like a \"curtain went down\".  This lasted for around 4 minutes and her vision returned to normal.  She developed a mild headache at the top of her head around an hour later which has persisted.  Patient reports a history of migraines with visual aura that present differently than her episode of vision loss today.  She denies any focal weakness, numbness, tingling or facial droop.        Sudden right eye vision loss, now resolved. Concern for amaurosis fugax versus acute ischemic stroke  - also consider Migraine with visual aura given pmhx   - CTH/CTA negative  - serial neuro checks and vital signs q4 hours  - check lipid panel and hgb A1C  - Statin: high intensity statin pending LDL panel   - ASA 325mg x1 now  - monitor on telemetry  - Echocardiogram  - MRI brain  - Stroke Neurology consult    Hx Migraine HA  Pt reports hx of migraine headaches with visual aura that present differently than her vision loss today.  She has a mild headache currently.  - analgesics prn    Asthma  No signs of exacerbation  - resume inhalers upon discharge    Hypothyroidism  - continue " "Levothyroxine      CODE: full  Diet/IVF: regular  DVT ppx: scd  Medically Ready for Discharge: Anticipated Tomorrow        Rosario Hidalgofranco PAUL PA-C  Physician Assistant   Hospitalist Service                Chief Complaint:   Loss of Vision         HPI:   80 year old female with a history of Skin Cancer, Osteoporosis, Cervical Radiculopathy, Migraine HA, Hypothyroidism, Asthma, GERD, OA who presents to the ED today due to right eye vision loss.    History obtained from patient, chart review and verbal discussion with the ED provider.    Patient reports she went a chiropractor appointment this morning where he worked on her neck and back.  On her drive home around 11am, she developed shadows of light grey in her right eye that slowly faded away and her vision returned to normal.  Around 3pm, she was in her home when she had a sudden onset of right eye vision loss like a \"curtain went down\".  This lasted for around 4 minutes and her vision returned to normal.  She developed a mild headache at the top of her head around an hour later which has persisted.  Patient reports a history of migraines with visual aura that present differently than her episode of vision loss today.  She denies any focal weakness, numbness, tingling or facial droop.           Past Medical History:     Anemia       Cataract       Hypothyroidism       Osteopenia       Migraine Headache       Asthma NOS       Osteoporosis       Gallbladder Disorder 1992     Polyp Colon            Past Surgical History:     DILATATION AND CURETTAGE         HYSTERECTOMY         SUPRACERVICAL HYSTERECTOMY             Social History:     Social History     Socioeconomic History    Marital status:      Spouse name: Not on file    Number of children: Not on file    Years of education: Not on file    Highest education level: Not on file   Occupational History    Not on file   Tobacco Use    Smoking status: Not on file    Smokeless tobacco: Not on file   Substance and " Sexual Activity    Alcohol use: Not on file    Drug use: Not on file    Sexual activity: Not on file   Other Topics Concern    Not on file   Social History Narrative    Not on file     Social Drivers of Health     Financial Resource Strain: Low Risk  (8/9/2023)    Received from [facility], [facility]    Overall Financial Resource Strain (CARDIA)     Difficulty of Paying Living Expenses: Not hard at all   Food Insecurity: No Food Insecurity (12/23/2024)    Received from [facility]    Hunger Vital Sign     Worried About Running Out of Food in the Last Year: Never true     Ran Out of Food in the Last Year: Never true   Transportation Needs: No Transportation Needs (12/23/2024)    Received from [facility]    PRAPARE - Transportation     Lack of Transportation (Medical): No     Lack of Transportation (Non-Medical): No   Physical Activity: Insufficiently Active (12/23/2024)    Received from [facility]    Exercise Vital Sign     Days of Exercise per Week: 1 day     Minutes of Exercise per Session: 20 min   Stress: Stress Concern Present (8/2/2022)    [facility], [facility]    [place] [employer] - Occupational Stress Questionnaire     Feeling of Stress : To some extent   Social Connections: Socially Integrated (8/2/2022)    [facility], [facility]    Social Connection and Isolation Panel [NHANES]     Frequency of Communication with Friends and Family: More than three times a week     Frequency of Social Gatherings with Friends and Family: Three times a week     Attends Quaker Services: More than 4 times per year     Active Member of Clubs or Organizations: Yes     Attends Club or Organization Meetings: 1 to 4 times per year     Marital Status:    Interpersonal Safety: Not At Risk (8/2/2022)    [facility]    Humiliation, Afraid, Rape, and Kick questionnaire      Fear of Current or Ex-Partner: No     Emotionally Abused: No     Physically Abused: No     Sexually Abused: No   Housing Stability: Low Risk  (12/23/2024)    Received from [facility]    Housing Stability     What is your living situation today?: I have a steady place to live          Family History:     Arthritis Brother Jerod Davenport     Coronary artery disease Brother Jerod Davenport     Hypertension Brother Jerod Davenport     Coronary artery disease Father Alli Davenport     Hypertension Father Alli Davenport     Breast cancer Father's Sister Malka Parker     Arthritis Mother Gracie Sellner     Colon polyps Mother Gracie Sellner     Dementia Mother Gracie Sellner     Hypertension Mother Gracie Sellner     Osteoporosis Mother Gracie Sellner     Stroke Mother Gracie Sellner     Transient ischemic attack Mother Gracie Sellner     Dementia Mother's Sister Clotilde Sneed     Osteoporosis Mother's Sister Clotilde Sneed     Stroke Mother's Sister Clotilde Sneed     Transient ischemic attack Mother's Sister Clotilde Sneed     Arthritis Sister Ginger Davenport     Osteoporosis Sister Ginger Issac     Transient ischemic attack Sister Ginger Davenoprt     Hypertension Son Andrew Gallegos            Allergies:      Allergies   Allergen Reactions    Black Pepper-Turmeric Other (See Comments)    Influenza Vaccines Other (See Comments)     Guillain barre syndrome in 2000    Azithromycin Diarrhea     stomach cramps    Ciprofloxacin Nausea and Vomiting    Dust Mite Extract Other (See Comments) and Itching    Dust Mites Other (See Comments)    Fluconazole Itching     Itching and jaw pain    Fluticasone Swelling     tongue    Fluticasone Furoate Swelling     tongue    Fluticasone-Salmeterol Headache    Gabapentin Other (See Comments)     Throat tightening/swelling    Hydrocodone Nausea and Vomiting    Levofloxacin Muscle Pain (Myalgia)     Ligament pain.      Ligament pain.    Metronidazole Other (See Comments)     Furry tongue and diarrhea     "Mold Itching     Mold, ragweed, mildew, spider webs    Molds & Smuts Itching     Mold, ragweed, mildew    Morphine Nausea and Vomiting    Nuts Itching     GI upset    Peanut-Containing Drug Products Nausea     Not allergic to peanuts however is allergic to pushpa nuts, pecans and brazillan nuts. Hives.    Not allergic to peanuts however is allergic to pushpa nuts, pecans and brazillan nuts    Piper Cough     \"choking feeling\"    Latex Rash          Medications:     Prior to Admission medications    Medication Sig Last Dose Taking? Auth Provider Long Term End Date   albuterol (PROAIR HFA/PROVENTIL HFA/VENTOLIN HFA) 108 (90 Base) MCG/ACT inhaler Inhale 1-2 puffs into the lungs every 4 hours as needed for shortness of breath or wheezing  Yes Blossom Howell MD Yes    multivitamin w/minerals (MULTI-VITAMIN) tablet Take 1 tablet by mouth daily. 2/24/2025 Morning Yes Unknown, Entered By History     SPIRIVA RESPIMAT 1.25 MCG/ACT inhaler Inhale 1 puff into the lungs daily 2/23/2025 Morning Yes Reported, Patient Yes    SYNTHROID 88 MCG tablet Take 88 mcg by mouth every morning (before breakfast). 2/24/2025 Morning Yes Reported, Patient Yes    VITAMIN D PO Take 1 capsule by mouth daily. 2/24/2025 Morning Yes Unknown, Entered By History            Review of Systems:   A complete ROS was performed and is negative other than what is stated in the HPI.       Physical Exam:   Blood pressure (!) 171/94, pulse 75, temperature 98  F (36.7  C), temperature source Temporal, resp. rate 20, height 1.6 m (5' 3\"), weight 57.6 kg (126 lb 15.8 oz), SpO2 99%.  General: Alert, interactive, NAD  HEENT: AT/NC, sclera anicteric, PERRL, EOMI, MMM  Chest/Resp: clear to auscultation bilaterally, no crackles or wheezes  Heart/CV: regular rate and rhythm, no murmur  Abdomen/GI: Soft, nontender, nondistended. +BS.  No rebound or guarding.  Extremities/MSK: No LE edema  Skin: Warm and dry  Neuro: Alert & oriented x 3, no focal deficits, moves all " extremities equally         Labs:   ROUTINE ICU LABS (Last four results)  CMP  Recent Labs   Lab 02/24/25  1613      POTASSIUM 4.3   CHLORIDE 102   CO2 25   ANIONGAP 13   *   BUN 15.1   CR 0.74   GFRESTIMATED 81   HARRIS 9.5     CBC  Recent Labs   Lab 02/24/25  1613   WBC 8.5   RBC 4.59   HGB 13.9   HCT 42.2   MCV 92   MCH 30.3   MCHC 32.9   RDW 12.5        INR  Recent Labs   Lab 02/24/25  1613   INR 0.97     Arterial Blood GasNo lab results found in last 7 days.       Imaging/Procedures:     Results for orders placed or performed during the hospital encounter of 02/24/25   CT Head w/o Contrast    Narrative    EXAM: CT HEAD W/O CONTRAST, CTA HEAD NECK W CONTRAST  LOCATION: [facility]  DATE: 2/24/2025    INDICATION: Acute neuro deficit, stroke TIA suspected; headache and transient vision loss  COMPARISON: None  CONTRAST: 67mL Isovue 370  TECHNIQUE: Head and neck CT angiogram with IV contrast. Noncontrast head CT followed by axial helical CT images of the head and neck vessels obtained during the arterial phase of intravenous contrast administration. Axial 2D reconstructed images and   multiplanar 3D MIP reconstructed images of the head and neck vessels were performed by the technologist. Dose reduction techniques were used. All stenosis measurements made according to NASCET criteria unless otherwise specified.    FINDINGS:   NONCONTRAST HEAD CT:   INTRACRANIAL CONTENTS: Portions of the posterior fossa are obscured by streak artifact. No acute intracranial hemorrhage, extraaxial collection, or mass effect. No evidence of an acute transcortical confluent infarct. Mild presumed chronic small vessel   ischemic changes. Normal ventricles and sulci for age.     VISUALIZED ORBITS/SINUSES/MASTOIDS: No acute intraorbital finding. No significant paranasal sinus or mastoid mucosal disease.     BONES/SOFT TISSUES: No acute abnormality.    HEAD CTA:  ANTERIOR CIRCULATION: No high-grade  stenosis, occlusion, aneurysm, or high-flow vascular malformation. Standard [place] of Rinaldi anatomy.    POSTERIOR CIRCULATION: No high-grade stenosis, occlusion, aneurysm, or high-flow vascular malformation. Essentially balanced vertebral arteries.     DURAL VENOUS SINUSES: Expected enhancement of the major dural venous sinuses. Please note that this exam is not specifically tailored for the assessment of the intracranial venous structures.    NECK CTA:  RIGHT CAROTID: No hemodynamically significant stenosis or dissection.    LEFT CAROTID: No hemodynamically significant stenosis or dissection.    VERTEBRAL ARTERIES: No focal high-grade stenosis or dissection. Essentially balanced vertebral arteries.    AORTIC ARCH: Partially imaged. A common origin of the brachiocephalic trunk and left common carotid artery, a normal anatomical variant. No significant stenosis at the origin of the great vessels.    NONVASCULAR STRUCTURES: Straightening of the normal cervical lordosis with 1-2 mm degenerative anterolisthesis at C4-C5 and retrolisthesis at C6-C7. Multilevel interbody degenerative change, worst at C6-C7 where there is severe intervertebral disc height   loss and moderate degenerative endplate changes. No high-grade spinal canal stenosis. Multilevel foraminal narrowing, worst and moderate-severe at C4-C5 on the right and C6-C7 bilaterally. Bilateral palatine tonsilloliths, greater on the left. A   subcentimeter well-circumscribed low attenuating ovoid mucosal lesion along the tongue base on the right, indeterminate although most likely a mucous retention cyst. No evidence of cervical lymphadenopathy. Mosaic attenuation of the lung parenchyma,   which may represent air trapping. No focal consolidation or pleural effusion within the visualized chest.      Impression    IMPRESSION:   HEAD CT:  1.  No acute intracranial hemorrhage, mass effect, or evidence of an acute transcortical infarct.  2.  Mild presumed chronic  microvascular ischemic change.    HEAD CTA:   1.  No large vessel occlusion, high-grade stenosis, aneurysm, or high-flow vascular malformation.    NECK CTA:  1.  No hemodynamically significant stenosis or dissection in the neck vessels.  2.  Nonvascular findings, as described.   CTA Head Neck with Contrast    Narrative    EXAM: CT HEAD W/O CONTRAST, CTA HEAD NECK W CONTRAST  LOCATION: [facility]  DATE: 2/24/2025    INDICATION: Acute neuro deficit, stroke TIA suspected; headache and transient vision loss  COMPARISON: None  CONTRAST: 67mL Isovue 370  TECHNIQUE: Head and neck CT angiogram with IV contrast. Noncontrast head CT followed by axial helical CT images of the head and neck vessels obtained during the arterial phase of intravenous contrast administration. Axial 2D reconstructed images and   multiplanar 3D MIP reconstructed images of the head and neck vessels were performed by the technologist. Dose reduction techniques were used. All stenosis measurements made according to NASCET criteria unless otherwise specified.    FINDINGS:   NONCONTRAST HEAD CT:   INTRACRANIAL CONTENTS: Portions of the posterior fossa are obscured by streak artifact. No acute intracranial hemorrhage, extraaxial collection, or mass effect. No evidence of an acute transcortical confluent infarct. Mild presumed chronic small vessel   ischemic changes. Normal ventricles and sulci for age.     VISUALIZED ORBITS/SINUSES/MASTOIDS: No acute intraorbital finding. No significant paranasal sinus or mastoid mucosal disease.     BONES/SOFT TISSUES: No acute abnormality.    HEAD CTA:  ANTERIOR CIRCULATION: No high-grade stenosis, occlusion, aneurysm, or high-flow vascular malformation. Standard [place] of Rinaldi anatomy.    POSTERIOR CIRCULATION: No high-grade stenosis, occlusion, aneurysm, or high-flow vascular malformation. Essentially balanced vertebral arteries.     DURAL VENOUS SINUSES: Expected enhancement of the major dural  venous sinuses. Please note that this exam is not specifically tailored for the assessment of the intracranial venous structures.    NECK CTA:  RIGHT CAROTID: No hemodynamically significant stenosis or dissection.    LEFT CAROTID: No hemodynamically significant stenosis or dissection.    VERTEBRAL ARTERIES: No focal high-grade stenosis or dissection. Essentially balanced vertebral arteries.    AORTIC ARCH: Partially imaged. A common origin of the brachiocephalic trunk and left common carotid artery, a normal anatomical variant. No significant stenosis at the origin of the great vessels.    NONVASCULAR STRUCTURES: Straightening of the normal cervical lordosis with 1-2 mm degenerative anterolisthesis at C4-C5 and retrolisthesis at C6-C7. Multilevel interbody degenerative change, worst at C6-C7 where there is severe intervertebral disc height   loss and moderate degenerative endplate changes. No high-grade spinal canal stenosis. Multilevel foraminal narrowing, worst and moderate-severe at C4-C5 on the right and C6-C7 bilaterally. Bilateral palatine tonsilloliths, greater on the left. A   subcentimeter well-circumscribed low attenuating ovoid mucosal lesion along the tongue base on the right, indeterminate although most likely a mucous retention cyst. No evidence of cervical lymphadenopathy. Mosaic attenuation of the lung parenchyma,   which may represent air trapping. No focal consolidation or pleural effusion within the visualized chest.      Impression    IMPRESSION:   HEAD CT:  1.  No acute intracranial hemorrhage, mass effect, or evidence of an acute transcortical infarct.  2.  Mild presumed chronic microvascular ischemic change.    HEAD CTA:   1.  No large vessel occlusion, high-grade stenosis, aneurysm, or high-flow vascular malformation.    NECK CTA:  1.  No hemodynamically significant stenosis or dissection in the neck vessels.  2.  Nonvascular findings, as described.

## 2025-02-25 NOTE — DISCHARGE SUMMARY
[facility]  Hospitalist Discharge Summary      Date of Admission:  2/24/2025  Date of Discharge:  2/25/2025  Discharging Provider: JACOB Wayne PA-C  Discharge Service: Hospitalist Service    Discharge Diagnoses   ***      Follow-ups Needed After Discharge   Follow-up Appointments       Follow-up and recommended labs and tests       Follow up with primary care provider, SHIRLEY JOHNSON, within 7 days for hospital follow- up and blood pressure management.    Follow up with stroke neurology 6 weeks.     Need to follow up with ophthalmology within the next week.              Discharge Disposition   Discharged to home  Condition at discharge: Stable    Hospital Course   {OPTIONAL -- use to select A&P section   :[id number]}    Consultations This Hospital Stay   NEUROLOGY IP STROKE CONSULT  NEUROLOGY IP STROKE CONSULT    Code Status   Full Code    Time Spent on this Encounter   I, JACOB Wayne PA-C, personally saw the patient today and spent less than or equal to 30 minutes discharging this patient.       JACOB Wayne PA-C  [facility] OBSERVATION DEPT  [address] [facility] [address]  Phone: [phone]  ______________________________________________________________________    Physical Exam   Vital Signs: Temp: 98.5  F (36.9  C) Temp src: Oral BP: (!) 142/84 Pulse: 80   Resp: 16 SpO2: 96 % O2 Device: None (Room air)    Weight: 123 lbs 3.2 oz    GENERAL:  Alert, Comfortable, No acute distress. Laying in bed.   PSYCH: pleasant, oriented.  HEENT:  Normocephalic, PERRLA. No scleral icterus or conjunctival injection  NECK:  Supple  HEART:  Normal S1, S2 with no murmur, RRR  LUNGS:  Normal Respiratory effort. Clear to auscultation bilaterally with no wheezing, rales or ronchi.  SKIN:  Warm, dry to touch. No rash.  NEUROLOGIC:  CN 2-12 intact, Finger to nose normal. BL 5/5 symmetric upper and lower extremity strength, sensation intact with no focal deficits. Speech clear,  alert & orientated x 4     Primary Care Physician   SHIRLEY JOHNSON    Discharge Orders      Adult Eye  Referral      Reason for your hospital stay    Right eye vision changes concern for amaurosis fugax     Follow-up and recommended labs and tests     Follow up with primary care provider, SHIRLEY JOHNSON, within 7 days for hospital follow- up and blood pressure management.    Follow up with stroke neurology 6 weeks.     Need to follow up with ophthalmology within the next week.     Activity    Your activity upon discharge: activity as tolerated     When to contact your care team    Go to emergency department if you have any of the following: One-sided weakness, facial droop, slurred speech, vision loss in 1 eye.     Monitor and record    Blood pressure: daily and bring record to next appointment.     Diet    Follow this diet upon discharge: Regular     Stroke Hospital Follow Up (for neurologist use only)    [employer] will call you to coordinate care as prescribed by your provider. If you don t hear from a representative within 2 business days, please call [phone].       ZIO PATCH MAIL OUT       Significant Results and Procedures   Results for orders placed or performed during the hospital encounter of 02/24/25   CT Head w/o Contrast    Narrative    EXAM: CT HEAD W/O CONTRAST, CTA HEAD NECK W CONTRAST  LOCATION: [facility]  DATE: 2/24/2025    INDICATION: Acute neuro deficit, stroke TIA suspected; headache and transient vision loss  COMPARISON: None  CONTRAST: 67mL Isovue 370  TECHNIQUE: Head and neck CT angiogram with IV contrast. Noncontrast head CT followed by axial helical CT images of the head and neck vessels obtained during the arterial phase of intravenous contrast administration. Axial 2D reconstructed images and   multiplanar 3D MIP reconstructed images of the head and neck vessels were performed by the technologist. Dose reduction techniques were used. All  stenosis measurements made according to NASCET criteria unless otherwise specified.    FINDINGS:   NONCONTRAST HEAD CT:   INTRACRANIAL CONTENTS: Portions of the posterior fossa are obscured by streak artifact. No acute intracranial hemorrhage, extraaxial collection, or mass effect. No evidence of an acute transcortical confluent infarct. Mild presumed chronic small vessel   ischemic changes. Normal ventricles and sulci for age.     VISUALIZED ORBITS/SINUSES/MASTOIDS: No acute intraorbital finding. No significant paranasal sinus or mastoid mucosal disease.     BONES/SOFT TISSUES: No acute abnormality.    HEAD CTA:  ANTERIOR CIRCULATION: No high-grade stenosis, occlusion, aneurysm, or high-flow vascular malformation. Standard [place] anatomy.    POSTERIOR CIRCULATION: No high-grade stenosis, occlusion, aneurysm, or high-flow vascular malformation. Essentially balanced vertebral arteries.     DURAL VENOUS SINUSES: Expected enhancement of the major dural venous sinuses. Please note that this exam is not specifically tailored for the assessment of the intracranial venous structures.    NECK CTA:  RIGHT CAROTID: No hemodynamically significant stenosis or dissection.    LEFT CAROTID: No hemodynamically significant stenosis or dissection.    VERTEBRAL ARTERIES: No focal high-grade stenosis or dissection. Essentially balanced vertebral arteries.    AORTIC ARCH: Partially imaged. A common origin of the brachiocephalic trunk and left common carotid artery, a normal anatomical variant. No significant stenosis at the origin of the great vessels.    NONVASCULAR STRUCTURES: Straightening of the normal cervical lordosis with 1-2 mm degenerative anterolisthesis at C4-C5 and retrolisthesis at C6-C7. Multilevel interbody degenerative change, worst at C6-C7 where there is severe intervertebral disc height   loss and moderate degenerative endplate changes. No high-grade spinal canal stenosis. Multilevel foraminal narrowing,  worst and moderate-severe at C4-C5 on the right and C6-C7 bilaterally. Bilateral palatine tonsilloliths, greater on the left. A   subcentimeter well-circumscribed low attenuating ovoid mucosal lesion along the tongue base on the right, indeterminate although most likely a mucous retention cyst. No evidence of cervical lymphadenopathy. Mosaic attenuation of the lung parenchyma,   which may represent air trapping. No focal consolidation or pleural effusion within the visualized chest.      Impression    IMPRESSION:   HEAD CT:  1.  No acute intracranial hemorrhage, mass effect, or evidence of an acute transcortical infarct.  2.  Mild presumed chronic microvascular ischemic change.    HEAD CTA:   1.  No large vessel occlusion, high-grade stenosis, aneurysm, or high-flow vascular malformation.    NECK CTA:  1.  No hemodynamically significant stenosis or dissection in the neck vessels.  2.  Nonvascular findings, as described.   CTA Head Neck with Contrast    Narrative    EXAM: CT HEAD W/O CONTRAST, CTA HEAD NECK W CONTRAST  LOCATION: [facility]  DATE: 2/24/2025    INDICATION: Acute neuro deficit, stroke TIA suspected; headache and transient vision loss  COMPARISON: None  CONTRAST: 67mL Isovue 370  TECHNIQUE: Head and neck CT angiogram with IV contrast. Noncontrast head CT followed by axial helical CT images of the head and neck vessels obtained during the arterial phase of intravenous contrast administration. Axial 2D reconstructed images and   multiplanar 3D MIP reconstructed images of the head and neck vessels were performed by the technologist. Dose reduction techniques were used. All stenosis measurements made according to NASCET criteria unless otherwise specified.    FINDINGS:   NONCONTRAST HEAD CT:   INTRACRANIAL CONTENTS: Portions of the posterior fossa are obscured by streak artifact. No acute intracranial hemorrhage, extraaxial collection, or mass effect. No evidence of an acute  transcortical confluent infarct. Mild presumed chronic small vessel   ischemic changes. Normal ventricles and sulci for age.     VISUALIZED ORBITS/SINUSES/MASTOIDS: No acute intraorbital finding. No significant paranasal sinus or mastoid mucosal disease.     BONES/SOFT TISSUES: No acute abnormality.    HEAD CTA:  ANTERIOR CIRCULATION: No high-grade stenosis, occlusion, aneurysm, or high-flow vascular malformation. Standard [place] of [place] anatomy.    POSTERIOR CIRCULATION: No high-grade stenosis, occlusion, aneurysm, or high-flow vascular malformation. Essentially balanced vertebral arteries.     DURAL VENOUS SINUSES: Expected enhancement of the major dural venous sinuses. Please note that this exam is not specifically tailored for the assessment of the intracranial venous structures.    NECK CTA:  RIGHT CAROTID: No hemodynamically significant stenosis or dissection.    LEFT CAROTID: No hemodynamically significant stenosis or dissection.    VERTEBRAL ARTERIES: No focal high-grade stenosis or dissection. Essentially balanced vertebral arteries.    AORTIC ARCH: Partially imaged. A common origin of the brachiocephalic trunk and left common carotid artery, a normal anatomical variant. No significant stenosis at the origin of the great vessels.    NONVASCULAR STRUCTURES: Straightening of the normal cervical lordosis with 1-2 mm degenerative anterolisthesis at C4-C5 and retrolisthesis at C6-C7. Multilevel interbody degenerative change, worst at C6-C7 where there is severe intervertebral disc height   loss and moderate degenerative endplate changes. No high-grade spinal canal stenosis. Multilevel foraminal narrowing, worst and moderate-severe at C4-C5 on the right and C6-C7 bilaterally. Bilateral palatine tonsilloliths, greater on the left. A   subcentimeter well-circumscribed low attenuating ovoid mucosal lesion along the tongue base on the right, indeterminate although most likely a mucous retention cyst. No evidence of  cervical lymphadenopathy. Mosaic attenuation of the lung parenchyma,   which may represent air trapping. No focal consolidation or pleural effusion within the visualized chest.      Impression    IMPRESSION:   HEAD CT:  1.  No acute intracranial hemorrhage, mass effect, or evidence of an acute transcortical infarct.  2.  Mild presumed chronic microvascular ischemic change.    HEAD CTA:   1.  No large vessel occlusion, high-grade stenosis, aneurysm, or high-flow vascular malformation.    NECK CTA:  1.  No hemodynamically significant stenosis or dissection in the neck vessels.  2.  Nonvascular findings, as described.   MR Brain w/o & w Contrast    Narrative    EXAM: MR BRAIN W/O and W CONTRAST  LOCATION: [facility]  DATE: 2/24/2025    INDICATION: Transient vision loss.  COMPARISON: CTA [place] of [place] and neck performed earlier same day.  CONTRAST: 6 mL Gadavist  TECHNIQUE: Routine multiplanar multisequence head MRI without and with intravenous contrast.    FINDINGS:  INTRACRANIAL CONTENTS: There are no areas of abnormally restricted diffusion to suggest acute or subacute infarct. No areas of abnormal parenchymal susceptibility. No areas of abnormal enhancement.    Ventricles are normal in size. Normal gray-white matter differentiation. Numerous patchy small foci of nonenhancing FLAIR hyperintensity are seen within the periventricular and deep white matter both hemispheres and to a lesser extent within the william.   These areas are nonspecific but compatible with areas of gliosis or chronic myelin loss.    Cerebellar tonsils are normally positioned. Visualized upper cervical spinal cord, sella, corpus callosum are unremarkable. Major skull base vascular flow-voids are preserved.    OSSEOUS STRUCTURES/SOFT TISSUES: Benign hyperostosis frontalis internus. Visualized marrow space otherwise unremarkable.    ORBITS: Prior cataract surgeries.     SINUSES/MASTOIDS: Mild ethmoid sinus mucosal thickening.  No middle ear or mastoid effusion.       Impression    IMPRESSION:  1.  No finding for intracranial hemorrhage, mass, or acute infarct.    2.  At least mild presumed sequela chronic microvascular ischemic change.   Echocardiogram Complete     Value    LVEF  55%    [facility]    [id number]  [id number]  [id number]  [id number]^JUSTYNA^HALIMA^S     [facility]  Echocardiography Laboratory  [address] [place] [address]     Name: MEAGAN CATES  MRN: [id number]  : 1945  Study Date: 2025 07:51 AM  Age: 80 yrs  Gender: Female  Patient Location: [facility]  Reason For Study: TIA  Ordering Physician: HALIMA JANSEN  Performed By: Betty Vides     BSA: 1.6 m2  Height: 63 in  Weight: 126 lb  BP: 171/94 mmHg  ______________________________________________________________________________  Procedure  Echocardiogram with two-dimensional, color and spectral Doppler.  ______________________________________________________________________________  Interpretation Summary     Left ventricular systolic function is normal.  The visual ejection fraction is estimated at 55%.  The right ventricle is normal in structure, function and size.  No hemodynamically significant valvular abnormalities on 2D or color flow  imaging. There is no comparison study available.  ______________________________________________________________________________  Left Ventricle  The left ventricle is normal in size. Diastolic Doppler findings (E/E' ratio  and/or other parameters) suggest left ventricular filling pressures are  indeterminate. Left ventricular systolic function is normal. The visual  ejection fraction is estimated at 55%. No regional wall motion abnormalities  noted.     Right Ventricle  The right ventricle is normal in structure, function and size.     Atria  The left atrium is not well visualized. Normal left atrial size. Right atrial  size is normal. There is no color Doppler evidence of an atrial shunt.      Mitral Valve  The mitral valve is normal in structure and function. There is trace mitral  regurgitation. There is no mitral valve stenosis.     Tricuspid Valve  The tricuspid valve is not well visualized, but is grossly normal. The right  ventricular systolic pressure is approximated at 20.7 mmHg plus the right  atrial pressure.     Aortic Valve  The aortic valve is trileaflet with aortic valve sclerosis. There is trace  aortic regurgitation. No aortic stenosis is present.     Pulmonic Valve  The pulmonic valve is not well visualized. Normal pulmonic valve velocity.     Vessels  Normal size aorta. The inferior vena cava is normal.     Pericardium  There is no pericardial effusion.     ______________________________________________________________________________  MMode/2D Measurements & Calculations  IVSd: 0.50 cm  LVIDd: 4.4 cm  LVIDs: 2.7 cm  LVPWd: 0.51 cm     FS: 39.4 %  LV mass(C)d: 61.4 grams  LV mass(C)dI: 38.6 grams/m2  Ao root diam: 3.7 cm  LVOT diam: 1.9 cm  LVOT area: 2.9 cm2  Ao root diam index Ht(cm/m): 2.3  Ao root diam index BSA (cm/m2): 2.3  LA Volume (BP): 37.0 ml  LA Volume Index (BP): 23.3 ml/m2  RWT: 0.23  TAPSE: 2.2 cm     Doppler Measurements & Calculations  MV E max laura: 64.5 cm/sec  MV A max laura: 49.5 cm/sec  MV E/A: 1.3  MV dec slope: 446.9 cm/sec2  MV dec time: 0.14 sec  PA acc time: 0.14 sec  TR max laura: 228.0 cm/sec  TR max P.7 mmHg  E/E' av.5  Lateral E/e': 6.8  Medial E/e': 8.3     ______________________________________________________________________________  Report approved by: Jacki Barbour MD on 2025 08:49 AM             Discharge Medications   Current Discharge Medication List        START taking these medications    Details   aspirin (ASA) 81 MG chewable tablet Take 1 tablet (81 mg) by mouth daily.  Qty: 30 tablet, Refills: 1    Associated Diagnoses: TIA (transient ischemic attack)      atorvastatin (LIPITOR) 40 MG tablet Take 1 tablet (40 mg) by mouth  daily.  Qty: 30 tablet, Refills: 1    Associated Diagnoses: TIA (transient ischemic attack)      clopidogrel (PLAVIX) 75 MG tablet Take 1 tablet (75 mg) by mouth daily for 21 days.  Qty: 21 tablet, Refills: 0    Associated Diagnoses: TIA (transient ischemic attack)           CONTINUE these medications which have NOT CHANGED    Details   albuterol (PROAIR HFA/PROVENTIL HFA/VENTOLIN HFA) 108 (90 Base) MCG/ACT inhaler Inhale 1-2 puffs into the lungs every 4 hours as needed for shortness of breath or wheezing  Qty: 18 g, Refills: 0    Associated Diagnoses: Walking pneumonia      multivitamin w/minerals (MULTI-VITAMIN) tablet Take 1 tablet by mouth daily.      SPIRIVA RESPIMAT 1.25 MCG/ACT inhaler Inhale 1 puff into the lungs daily      SYNTHROID 88 MCG tablet Take 88 mcg by mouth every morning (before breakfast).      VITAMIN D PO Take 1 capsule by mouth daily.           Allergies   Allergies   Allergen Reactions    Black Pepper-Turmeric Other (See Comments)    Influenza Vaccines Other (See Comments)     Guillain barre syndrome in 2000    Azithromycin Diarrhea     stomach cramps    Ciprofloxacin Nausea and Vomiting    Dust Mite Extract Other (See Comments) and Itching    Dust Mites Other (See Comments)    Fluconazole Itching     Itching and jaw pain    Fluticasone Swelling     tongue    Fluticasone Furoate Swelling     tongue    Fluticasone-Salmeterol Headache    Gabapentin Other (See Comments)     Throat tightening/swelling    Hydrocodone Nausea and Vomiting    Levofloxacin Muscle Pain (Myalgia)     Ligament pain.      Ligament pain.    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 Hives.    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 Hives.    Not allergic to peanuts however is allergic to pushpa nuts, pecans and brazillan nuts    Piper Cough     \"choking feeling\"    Latex Rash     "

## 2025-02-25 NOTE — ED NOTES
"[facility]  ED Nurse Handoff Report    ED Chief complaint: Loss of Vision  . ED Diagnosis:   Final diagnoses:   TIA (transient ischemic attack)       Allergies:   Allergies   Allergen Reactions    Black Pepper-Turmeric Other (See Comments)    Influenza Vaccines Other (See Comments)     Guillain barre syndrome in 2000    Azithromycin Diarrhea     stomach cramps    Ciprofloxacin Nausea and Vomiting    Dust Mite Extract Other (See Comments) and Itching    Dust Mites Other (See Comments)    Fluconazole Itching     Itching and jaw pain    Fluticasone Swelling     tongue    Fluticasone Furoate Swelling     tongue    Fluticasone-Salmeterol Headache    Gabapentin Other (See Comments)     Throat tightening/swelling    Hydrocodone Nausea and Vomiting    Levofloxacin Muscle Pain (Myalgia)     Ligament pain.      Ligament pain.    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 Hives.    Not allergic to peanuts however is allergic to pushpa nuts, pecans and brazillan nuts    Piper Cough     \"choking feeling\"    Latex Rash       Code Status: Full Code    Activity level - Baseline/Home:  independent.  Activity Level - Current:   standby.   Lift room needed: No.   Bariatric: No   Needed: No   Isolation: No.   Infection: Not Applicable.     Respiratory status: Room air    Vital Signs (within 30 minutes):   Vitals:    02/24/25 1600 02/24/25 1805   BP: (!) 148/86 (!) 171/94   Pulse: 83 75   Resp: 20    Temp: 98  F (36.7  C)    TempSrc: Temporal    SpO2: 99% 99%   Weight: 57.6 kg (126 lb 15.8 oz)    Height: 1.6 m (5' 3\")        Cardiac Rhythm:  ,      Pain level:    Patient confused: No. " "  Patient Falls Risk: nonskid shoes/slippers when out of bed and patient and family education.   Elimination Status: Has voided     Patient Report - Initial Complaint: Loss of Vision.   Focused Assessment: Kemi Wesley is a 80 year old female with a history of osteoarthritis, Guillain-Huntley syndrome, migraine, and squamous cell skin cancer who presents to the Emergency Department for loss of vision. The patient reports at 1100 the vision in her right eye began intermittently getting \"shady\". At 1530, she was standing talking to her  when she describes a curtain coming down in her right eye, although her vision did eventually return. At this time, she also had a headache. Of note, the patient endorses neck pain at baseline and had a chiropractic neck adjustment this morning (2/24/25). Denies numbness, weakness, tingling, chest pain, shortness of breath, vomiting, double vision, loss of consciousness, or recent falls.      Abnormal Results:   Labs Ordered and Resulted from Time of ED Arrival to Time of ED Departure   BASIC METABOLIC PANEL - Abnormal       Result Value    Sodium 140      Potassium 4.3      Chloride 102      Carbon Dioxide (CO2) 25      Anion Gap 13      Urea Nitrogen 15.1      Creatinine 0.74      GFR Estimate 81      Calcium 9.5      Glucose 102 (*)    INR - Normal    INR 0.97     PARTIAL THROMBOPLASTIN TIME - Normal    aPTT 27     TROPONIN T, HIGH SENSITIVITY - Normal    Troponin T, High Sensitivity <6     CBC WITH PLATELETS AND DIFFERENTIAL    WBC Count 8.5      RBC Count 4.59      Hemoglobin 13.9      Hematocrit 42.2      MCV 92      MCH 30.3      MCHC 32.9      RDW 12.5      Platelet Count 220      % Neutrophils 61      % Lymphocytes 30      % Monocytes 6      % Eosinophils 2      % Basophils 1      % Immature Granulocytes 0      NRBCs per 100 WBC 0      Absolute Neutrophils 5.2      Absolute Lymphocytes 2.6      Absolute Monocytes 0.5      Absolute Eosinophils 0.2      Absolute " Basophils 0.0      Absolute Immature Granulocytes 0.0      Absolute NRBCs 0.0     GLUCOSE MONITOR NURSING POCT   GLUCOSE MONITOR NURSING POCT   HEMOGLOBIN A1C        CTA Head Neck with Contrast   Final Result   IMPRESSION:    HEAD CT:   1.  No acute intracranial hemorrhage, mass effect, or evidence of an acute transcortical infarct.   2.  Mild presumed chronic microvascular ischemic change.      HEAD CTA:    1.  No large vessel occlusion, high-grade stenosis, aneurysm, or high-flow vascular malformation.      NECK CTA:   1.  No hemodynamically significant stenosis or dissection in the neck vessels.   2.  Nonvascular findings, as described.      CT Head w/o Contrast   Final Result   IMPRESSION:    HEAD CT:   1.  No acute intracranial hemorrhage, mass effect, or evidence of an acute transcortical infarct.   2.  Mild presumed chronic microvascular ischemic change.      HEAD CTA:    1.  No large vessel occlusion, high-grade stenosis, aneurysm, or high-flow vascular malformation.      NECK CTA:   1.  No hemodynamically significant stenosis or dissection in the neck vessels.   2.  Nonvascular findings, as described.      MR Brain w/o & w Contrast    (Results Pending)   Echocardiogram Complete    (Results Pending)       Treatments provided: see above.  Family Comments: SonLester, has been at bedside.  OBS brochure/video discussed/provided to patient:  No  ED Medications:   Medications   levothyroxine (SYNTHROID/LEVOTHROID) tablet 88 mcg (has no administration in time range)   acetaminophen (TYLENOL) tablet 650 mg (has no administration in time range)   ondansetron (ZOFRAN ODT) ODT tab 4 mg (has no administration in time range)     Or   ondansetron (ZOFRAN) injection 4 mg (has no administration in time range)   CT Scan Flush (80 mLs Intravenous $Given 2/24/25 1630)   iopamidol (ISOVUE-370) solution 500 mL (67 mLs Intravenous $Given 2/24/25 1630)   aspirin (ASA) tablet 325 mg (325 mg Oral $Given 2/24/25 1801)       Drips  infusing:  No  For the majority of the shift this patient was Green.   Interventions performed were see above.    Sepsis treatment initiated: No    Cares/treatment/interventions/medications to be completed following ED care: see above.    ED Nurse Name: Concepcion Baltazar RN  7:46 PM    RECEIVING UNIT ED HANDOFF REVIEW    Above ED Nurse Handoff Report was reviewed: Yes  Reviewed by: Rika Najera RN on February 24, 2025 at 9:02 PM   KEANU Miguel called the ED to inform them the note was read: Yes

## 2025-02-26 NOTE — TELEPHONE ENCOUNTER
[phone]     Released from hospital yesterday. Will call back when she is home to schedule.     Ok to schedule next available with Dr. Golden.    Tana Kirkland RN on 2/26/2025 at 8:52 AM

## 2025-02-26 NOTE — TELEPHONE ENCOUNTER
Patient returned the phone call to schedule her appointment.  Patient is scheduled.  Patient scheduled on March 6 with Dr. Golden, appointment time Noon, arrival time of 11:50. Patient aware of date/time/location/duration (2-4 hrs)/parking/hospital based clinic information for appointment. Patient confirmed appointment time and all questions were answered.

## 2025-02-27 ENCOUNTER — PATIENT OUTREACH (OUTPATIENT)
Dept: CARE COORDINATION | Facility: CLINIC | Age: 80
End: 2025-02-27
Payer: MEDICARE

## 2025-02-27 NOTE — PROGRESS NOTES
Clinic Care Coordination Contact  Transitions of Care Outreach    No chief complaint on file.      Most Recent Admission Date: 2/24/2025   Most Recent Admission Diagnosis: TIA (transient ischemic attack) - G45.9     Most Recent Discharge Date: 2/25/2025   Most Recent Discharge Diagnosis: TIA (transient ischemic attack) - G45.9  Vision loss of right eye - H54.61     Transitions of Care Assessment    Discharge Assessment  How are you doing now that you are home?: Pt stated that she is doing well  How are your symptoms? (Red Flag symptoms escalate to triage hotline per guidelines): Improved  Do you know how to contact your clinic care team if you have future questions or changes to your health status? : Yes  Does the patient have their discharge instructions? : Yes  Does the patient have questions regarding their discharge instructions? : No  Were you started on any new medications or were there changes to any of your previous medications? : Yes  Does the patient have all of their medications?: Yes  Do you have questions regarding any of your medications? : No  Do you have all of your needed medical supplies or equipment (DME)?  (i.e. oxygen tank, CPAP, cane, etc.): Yes    Post-op ([facility] CTA Only)  If the patient had a surgery or procedure, do they have any questions for a nurse?: No         Follow up Plan     Future Appointments   Date Time Provider Department Center   3/6/2025 12:00 PM Karen Golden OD [facility]       Outpatient Plan as outlined on AVS reviewed with patient.      For any urgent concerns, please contact our 24 hour nurse triage line: [phone]     [place], CHW  [phone]  [facility] Care Resource [facility]

## 2025-03-05 NOTE — PROGRESS NOTES
Kemi Wesley is a 80 year old female with the following diagnoses:   1. Transient visual loss of right eye    2. Abnormal peripheral vision of right eye       HPI:    The morning of 2/24/25 Kemi was seen for a chiropractic appointment. On her drive home, she developed shadows of light grey in her vision (unclear which eye) that slowly faded away and her vision returned to normal.  A few hours later, she had a sudden onset of right eye vision change--she describes a curtain that started up and towards her nose that gradually came down sparing a round island in the corner of her vision.  Everything around the island was black.  This lasted for around 4 minutes and her vision gradually returned to normal.  She developed a mild headache afterwards; this episode was different than her typical migraine headache and aura.    She was seen in the ED same-day with negative stroke work-up.  Patient initiated Plavix and ASA as well as Ziopatch monitoring.  She discontinued Plavix per direction of her PCP (made patient nauseous).    She reports it has not happened again since.  She has an aching pain behind the left eye, but right eye feels normal.    She reports some tenderness/sensitivity above her left ear as well as an aching pain on the left side of her jaw if she chews gum; she has tension/tightness on the left side of her neck.  Denies anorexia, fever/chills, or myalgias.    Independent historians:  Patient    Review of outside testing:  MRI Brain [facility] 2/24/25:  IMPRESSION:  1.  No finding for intracranial hemorrhage, mass, or acute infarct.  2.  At least mild presumed sequela chronic microvascular ischemic change.    CT/A Head + Neck:  IMPRESSION:   HEAD CT:  1.  No acute intracranial hemorrhage, mass effect, or evidence of an acute transcortical infarct.  2.  Mild presumed chronic microvascular ischemic change.     HEAD CTA:   1.  No large vessel occlusion, high-grade stenosis, aneurysm, or high-flow  "vascular malformation.     NECK CTA:  1.  No hemodynamically significant stenosis or dissection in the neck vessels.  2.  Nonvascular findings, as described.    Review of outside clinical notes:  Discharge Note 2/24/25:  Kemi Wesley is a 80 year old female with a history of Skin Cancer, Osteoporosis, Cervical Radiculopathy, Migraine HA, Hypothyroidism, Asthma, GERD, OA who presents to the ED today due to right eye vision loss.     Patient reports she went a chiropractor appointment this morning where he worked on her neck and back.  On her drive home around 11am, she developed shadows of light grey in her right eye that slowly faded away and her vision returned to normal.  Around 3pm, she was in her home when she had a sudden onset of right eye vision loss like a \"curtain went down\".  This lasted for around 4 minutes and her vision returned to normal.  She developed a mild headache at the top of her head around an hour later which has persisted.  Patient reports a history of migraines with visual aura that present differently than her episode of vision loss today.  She denies any focal weakness, numbness, tingling or facial droop.       Assumed patient care today.  Patient had no further vision symptoms or changes.  Patient vision completely back to normal per patient.  Seen by stroke neurology concern for amaurosis fugax.  Started on Plavix and aspirin.  Plavix for 21 days in combination with aspirin then aspirin indefinitely. Started high intensity statin, outpatient Zio patch and needs ophthalmology follow-up within 1 week.  No other neurological deficits on physical exam.  MRI negative for CVA.  Educated patient on stroke warning signs and when to return to the emergency department.  Patient felt comfortable with the discharge plan, all questions were answered prior to discharge.     Sudden right eye vision loss, now resolved. Concern for amaurosis fugax.  - also consider Migraine with visual aura given " pmhx   - CTH/CTA negative  - serial neuro checks and vital signs q4 hours  - check lipid panel and hgb A1C  - Statin: high intensity statin pending LDL panel   - ASA 325mg x1 now  - monitor on telemetry  - Echocardiogram  - MRI brain: negative for CVA  - Stroke Neurology consult              - Plavix 300 mg before discharge, 75 mg daily for 21 days with 81 mg daily together then indefinitely               - start high intensity statin               - outpatient zio patch               - need ophthalmology within a week               - stroke follow up in 4-8 weeks      Hx Migraine HA  Pt reports hx of migraine headaches with visual aura that present differently than her vision loss today.  She has a mild headache currently.  - analgesics prn    Past medical history:  Patient Active Problem List   Diagnosis    TIA (transient ischemic attack)     Medications:   albuterol  aspirin  Multi-vitamin  Spiriva Respimat Aers  Synthroid Tabs  VITAMIN D PO  Zofran    Family history / social history:  Patient's family ocular history is remarkable for retinal disease (sister) and glaucoma.    Patient denies smoking and drinking.    Exam:  Corrected distance visual acuity was 20/20 in the right eye and 20/20 -3 in the left eye. Intraocular pressure was 21 in the right eye and 19 in the left eye using ICare.  Color vision 11/11 right eye and 11/11 left eye.  Pupils isocoric with no APD.    See complete chart note for anterior segment, fundus, and strabismus exam.    Tests ordered and interpreted today:  OCT RNFL:  Right eye: Average RNFL 78 microns.  Superonasal thinning.  Left eye: Average RNFL 80 microns.  Normal compared to age-matched controls.    24-2 SF:  Right eye: Fairly reliable.  Mean deviation -0.50 dB.  Inferonasal depression.  Left eye: Fairly reliable.  Mean deviation -1.95 dB.  Non-specific defect.    Assessment/Plan:   Kemi presents today for ED follow-up after a 4-minute episode of transient vision loss RIGHT  eye, sparing an inferior island.  She was seen in the ED same day, 2/24/25, with negative initial stroke work-up (negative MRI Brain WWO and CT/A Head+Neck).  She is currently wearing a Ziopatch monitor.  Plan for neurology follow-up on 3/28/25.  Her exam today is remarkable for borderline SN thinning right eye.    I am concerned that she is reporting LEFT scalp tenderness and jaw claudication.  I will order ESR/CRP to complete her work-up.  We discussed that if her markers are elevated, I will have her initiate prednisone and transfer care to one of my neuro-ophthalmology colleagues for a temporal artery biopsy.  She expressed understanding.  I will call her as soon as I have results.    Complete documentation of historical and exam elements from today's encounter can be found in the full encounter summary report (not reduplicated in this progress note). I personally obtained the chief complaint(s) and history of present illness. I confirmed and edited as necessary the review of systems, past medical/surgical history, family history, social history, and examination findings as document by others; and I examined the patient myself. I personally reviewed the relevant tests, images, and reports as documented above. I formulated and edited as necessary the assessment and plan and discussed the findings and management plan with the patient and family.    Karen Golden OD

## 2025-03-06 ENCOUNTER — LAB (OUTPATIENT)
Dept: LAB | Facility: CLINIC | Age: 80
End: 2025-03-06
Payer: MEDICARE

## 2025-03-06 ENCOUNTER — OFFICE VISIT (OUTPATIENT)
Dept: OPHTHALMOLOGY | Facility: CLINIC | Age: 80
End: 2025-03-06
Payer: MEDICARE

## 2025-03-06 DIAGNOSIS — H53.121 TRANSIENT VISUAL LOSS OF RIGHT EYE: ICD-10-CM

## 2025-03-06 DIAGNOSIS — H53.451 ABNORMAL PERIPHERAL VISION OF RIGHT EYE: ICD-10-CM

## 2025-03-06 DIAGNOSIS — H53.121 TRANSIENT VISUAL LOSS OF RIGHT EYE: Primary | ICD-10-CM

## 2025-03-06 LAB
CRP SERPL-MCNC: <3 MG/L
ERYTHROCYTE [SEDIMENTATION RATE] IN BLOOD BY WESTERGREN METHOD: 8 MM/HR (ref 0–30)

## 2025-03-06 PROCEDURE — 92133 CPTRZD OPH DX IMG PST SGM ON: CPT

## 2025-03-06 PROCEDURE — 92083 EXTENDED VISUAL FIELD XM: CPT

## 2025-03-06 PROCEDURE — G0463 HOSPITAL OUTPT CLINIC VISIT: HCPCS

## 2025-03-06 RX ORDER — PANTOPRAZOLE SODIUM 40 MG/1
40 TABLET, DELAYED RELEASE ORAL DAILY
COMMUNITY
Start: 2025-03-03

## 2025-03-06 RX ORDER — LEVOTHYROXINE SODIUM 100 MCG
TABLET ORAL
COMMUNITY
Start: 2024-03-21

## 2025-03-06 RX ORDER — ONDANSETRON 4 MG/1
4 TABLET, ORALLY DISINTEGRATING ORAL
COMMUNITY
Start: 2024-12-23

## 2025-03-06 ASSESSMENT — CONF VISUAL FIELD
OS_INFERIOR_NASAL_RESTRICTION: 0
OD_SUPERIOR_TEMPORAL_RESTRICTION: 0
METHOD: COUNTING FINGERS
OS_INFERIOR_TEMPORAL_RESTRICTION: 0
OD_INFERIOR_TEMPORAL_RESTRICTION: 0
OD_INFERIOR_NASAL_RESTRICTION: 0
OS_SUPERIOR_TEMPORAL_RESTRICTION: 0
OD_NORMAL: 1
OS_NORMAL: 1
OS_SUPERIOR_NASAL_RESTRICTION: 0
OD_SUPERIOR_NASAL_RESTRICTION: 0

## 2025-03-06 ASSESSMENT — TONOMETRY
IOP_METHOD: ICARE
OD_IOP_MMHG: 21
OS_IOP_MMHG: 19

## 2025-03-06 ASSESSMENT — REFRACTION_WEARINGRX
OS_AXIS: 076
SPECS_TYPE: PAL
OD_ADD: +2.50
OS_ADD: +2.50
OS_SPHERE: PLANO
OS_CYLINDER: +0.75
OD_AXIS: 098
OD_CYLINDER: +1.50
OD_SPHERE: -1.00

## 2025-03-06 ASSESSMENT — SLIT LAMP EXAM - LIDS
COMMENTS: DERMATOCHALASIS
COMMENTS: DERMATOCHALASIS

## 2025-03-06 ASSESSMENT — VISUAL ACUITY
METHOD: SNELLEN - LINEAR
OS_CC+: -3
OD_CC: 20/20
OS_CC: 20/20
CORRECTION_TYPE: GLASSES

## 2025-03-06 ASSESSMENT — CUP TO DISC RATIO
OD_RATIO: 0.3
OS_RATIO: 0.3

## 2025-03-06 ASSESSMENT — EXTERNAL EXAM - LEFT EYE: OS_EXAM: NORMAL

## 2025-03-06 ASSESSMENT — EXTERNAL EXAM - RIGHT EYE: OD_EXAM: NORMAL

## 2025-03-26 LAB — CV ZIO PRELIM RESULTS: NORMAL

## 2025-06-12 ENCOUNTER — VIRTUAL VISIT (OUTPATIENT)
Dept: NEUROLOGY | Facility: CLINIC | Age: 80
End: 2025-06-12
Payer: MEDICARE

## 2025-06-12 DIAGNOSIS — G45.3 AMAUROSIS FUGAX, RIGHT EYE: Primary | ICD-10-CM

## 2025-06-12 RX ORDER — ASPIRIN 81 MG/1
81 TABLET ORAL DAILY
Qty: 90 TABLET | Refills: 3 | Status: SHIPPED | OUTPATIENT
Start: 2025-06-12

## 2025-06-12 RX ORDER — ASPIRIN 81 MG/1
81 TABLET, CHEWABLE ORAL
COMMUNITY

## 2025-06-12 NOTE — LETTER
6/12/2025      Kemi Wesley  [address] [facility] [address]      Dear Colleague,    Thank you for referring your patient, Kemi Wesley, to the [facility]. Please see a copy of my visit note below.    Virtual Visit Details    Type of service:  Video Visit   Video Start Time: 10:06 AM  Video End Time:10:30 AM    Originating Location (pt. Location): Home    Distant Location (provider location):  Off-site  Platform used for Video Visit: [employer]    __________________________________________________________      [facility]   [phone]  __________________________________________________________           History of Present Illness   Chief Complaint: Patient presents with:  Stroke      Kemi Wesley is a 80 year old female presenting for follow-up for  R amaurosis fugax.      She was hospitalized at [facility] on 2/24/25. Prior to the hospital stay, she had a past medical history of depression, migraine with visual aura and asthma. She presented to the hospital with transient R eye visual loss.  Stroke workup was unremarkable and she was diagnosed with amaurosis fugax.  She was treated with DAPT x 21 days, Lipitor 40 with recommendation for outpatient ophthalmology follow-up cardiac event monitoring.     Most recent stroke follow-up was with myself 3/28/2025.  At that time she denied any recurrent episodes of visual loss.  She did endorse some diffuse left-sided cervicogenic headaches and jaw pain attributed to clenching/grinding but no true jaw claudication, temporal headache or other symptoms suggestive of giant cell arteritis.  She was concerned about potential side effects of Plavix and aspirin so discontinued, had also discontinued the statin due to concern of potential side effects..  She was pursuing second opinion at [facility].    Today, Kemi reports that she has had no recurrent visual episodes since her most recent  Left message for patient to return our call.    visit together.  She does continue to have intermittent left-sided headaches starting in the cervical region and radiating forward in an occipital nerve distribution to behind the eye; these are occurring approximately once per week but fully resolved with Aleve.  She denies any severe pain/tenderness in the occipital region.  She continues to have some mild jaw pain which she attributes to clenching but no true jaw claudication.  No widespread myalgias or fevers.  She denies any new symptoms or concern since her most recent visit.    She is currently taking aspirin 81 mg; she has been limiting this to 3 times per week due to concern that she may develop stomach upset.  She is not currently taking enteric-coated but would like to switch to this and a prescription has been sent.  She continues to prefer to stay off of cholesterol medication.  She does have a cuff but is not checking blood pressure regularly.    She is seeing neuro-ophthalmology at [place] later this month.  She is currently wearing a 30-day heart monitor.    She continues to endorse high levels of stress.  Her  continues to be very ill.  She continues to have somewhat limited time to take care of herself and exercise due to this.    Modified [place] Scale  Score: 0-No symptoms    Stroke Evaluation Summarized:  New results resulted and reviewed by me today are in BOLD below.      MRI/Head CT MRI brain shows no acute finding   Intracranial Vasculature CTA head normal   Cervical Vasculature CTA neck normal      Echocardiogram EF 55%, no cardiac source of embolism   EKG/Telemetry SR   Other Testing Cardiac event monitor: no atrial fibrillation     ESR: 8  CRP: <3.00      Labs Lab Results   Component Value Date     (H) 02/24/2025    A1C 5.8 (H) 02/24/2025    CTROPT <6 02/24/2025    INR 0.97 02/24/2025                 Home Medications     Current Outpatient Medications   Medication Sig Dispense Refill     albuterol (PROAIR HFA/PROVENTIL  "HFA/VENTOLIN HFA) 108 (90 Base) MCG/ACT inhaler Inhale 1-2 puffs into the lungs every 4 hours as needed for shortness of breath or wheezing 18 g 0     aspirin (ASA) 81 MG chewable tablet Take 81 mg by mouth three times a week.       aspirin 81 MG EC tablet Take 1 tablet (81 mg) by mouth daily. 90 tablet 3     multivitamin w/minerals (MULTI-VITAMIN) tablet Take 1 tablet by mouth daily.       ondansetron (ZOFRAN ODT) 4 MG ODT tab Take 4 mg by mouth.       pantoprazole (PROTONIX) 40 MG EC tablet Take 40 mg by mouth daily.       SPIRIVA RESPIMAT 1.25 MCG/ACT inhaler Inhale 1 puff into the lungs daily       SYNTHROID 88 MCG tablet Take 88 mcg by mouth every morning (before breakfast).       VITAMIN D PO Take 1 capsule by mouth daily.       No current facility-administered medications for this visit.            Physical Examination     Vitals:  Vitals - Patient Reported  Systolic (Patient Reported): 122  Diastolic (Patient Reported): 80  Weight (Patient Reported): 55.3 kg (122 lb)  Height (Patient Reported): 160 cm (5' 3\")  BMI (Based on Pt Reported Ht/Wt): 21.61         BMI Readings from Last 1 Encounters:   02/25/25 21.82 kg/m        Neurologic: Completed via telemedicine video call  Mental Status:  alert, oriented x 3, speech clear and fluent  Cranial Nerves:  EOMI with normal smooth pursuit, facial movements symmetric, hearing not formally tested but intact to conversation, no dysarthria, shoulder shrug equal bilaterally, tongue protrusion midline  Motor:  no abnormal movements, able to move all limbs antigravity spontaneously with no signs of hemiparesis observed, no pronator drift         Screenings and Questionnaires:     Tobacco:    Tobacco Use      Smoking status: Never      Smokeless tobacco: Never      Sleep Apnea:       Depression:      3/6/2025    11:58 AM   PHQ-2 ( 1999 [employer])   Q1: Little interest or pleasure in doing things 0   Q2: Feeling down, depressed or hopeless 0   PHQ-2 Score 0       Stroke " Recovery and Risk Factors:      3/27/2025     7:21 PM   Stroke Questionnaire   Residual effects: Any residual effects from stroke? I have some symptoms, but I'm not sure if they're residual effects of the stroke   Residual effects: Describe Exhaustion. I am care giver for my .   Level of independence: Could you live alone? Yes   Quality of Life: Rating (0-100%) 80   Quality of Life: What work now or before stroke Retired   Quality of Life: Current work status Retired   Quality of Life: How do you get around Drive myself   Quality of Life: Living situation before stroke With family or significant other   Quality of Life: Living situation now With family or significant other   Medication: How do you take your meds Myself, from individual bottles   Medication: Do you ever miss or forget meds Rarely   Risk Factor: Checking blood pressure at home No, I have a blood pressure cuff but do not use it   Risk Factor: Why not checking BP at home Other   Risk Factor: Other reason not checking BP Rarely have blood pressure issues.   Risk Factor: Checking blood sugar at home No   Risk Factor: Second-hand smoke at home No   Risk Factor: How much caffeine per day 2-3 cups daily   Who completed this questionnaire? The patient independently           Assessment and Plan   Right amaurosis fugax, February 2025    Episode L sided headache; distribution sounds consistent with occipital neuralgia +/- some contribution from migraine     -Continue aspirin 81mg daily for stroke prevention  -Recommend Lipitor; she declines at this time  -Goal blood pressure is <130/80 with tighter control associated with improved vascular outcomes. Discussed starting home BP monitoring at least once weekly    -Second opinion scheduled at [place] later this month  -Continue to monitor headaches; if symptomatic worsening could consider preventative supplements/medications or trial of occipital nerve block  -Monitor for any emerging symptoms of temporal  arteritis; current suspicion low  -May consider prolonged cardiac monitoring in future if etiology of amaurosis fugax is not identified     - Return in about 4 months (around 10/12/2025) for TIA, with [facility] only.     Stroke Education provided.  She will call us with any questions.  For any acute neurologic deficits she was advised to  go directly to the hospital rather than call the clinic.      STEFFEN Lowery CNP  Neurology  06/12/2025         ____________________________________________________________________    Billing:  Please note: for coding purposes this visit should be billed only as established and not new, since this patient was seen by my same subspecialty team ([facility]) during the hospitalization that this visit is a follow up for.  I spent a total of 50 minutes on the day of the visit.   Time spent by me today doing chart review, history and exam, documentation and further activities per the note      Again, thank you for allowing me to participate in the care of your patient.        Sincerely,        STEFFEN Lowery CNP    Electronically signed

## 2025-06-12 NOTE — PROGRESS NOTES
Virtual Visit Details    Type of service:  Video Visit   Video Start Time: 10:06 AM  Video End Time:10:30 AM    Originating Location (pt. Location): Home    Distant Location (provider location):  Off-site  Platform used for Video Visit: [employer]    __________________________________________________________      [facility]   [phone]  __________________________________________________________           History of Present Illness   Chief Complaint: Patient presents with:  Stroke      Kemi Wesley is a 80 year old female presenting for follow-up for  R amaurosis fugax.      She was hospitalized at [facility] on 2/24/25. Prior to the hospital stay, she had a past medical history of depression, migraine with visual aura and asthma. She presented to the hospital with transient R eye visual loss.  Stroke workup was unremarkable and she was diagnosed with amaurosis fugax.  She was treated with DAPT x 21 days, Lipitor 40 with recommendation for outpatient ophthalmology follow-up cardiac event monitoring.     Most recent stroke follow-up was with myself 3/28/2025.  At that time she denied any recurrent episodes of visual loss.  She did endorse some diffuse left-sided cervicogenic headaches and jaw pain attributed to clenching/grinding but no true jaw claudication, temporal headache or other symptoms suggestive of giant cell arteritis.  She was concerned about potential side effects of Plavix and aspirin so discontinued, had also discontinued the statin due to concern of potential side effects..  She was pursuing second opinion at [facility].    Today, Kemi reports that she has had no recurrent visual episodes since her most recent visit together.  She does continue to have intermittent left-sided headaches starting in the cervical region and radiating forward in an occipital nerve distribution to behind the eye; these are occurring approximately once per week but fully resolved  with Aleve.  She denies any severe pain/tenderness in the occipital region.  She continues to have some mild jaw pain which she attributes to clenching but no true jaw claudication.  No widespread myalgias or fevers.  She denies any new symptoms or concern since her most recent visit.    She is currently taking aspirin 81 mg; she has been limiting this to 3 times per week due to concern that she may develop stomach upset.  She is not currently taking enteric-coated but would like to switch to this and a prescription has been sent.  She continues to prefer to stay off of cholesterol medication.  She does have a cuff but is not checking blood pressure regularly.    She is seeing neuro-ophthalmology at [place] later this month.  She is currently wearing a 30-day heart monitor.    She continues to endorse high levels of stress.  Her  continues to be very ill.  She continues to have somewhat limited time to take care of herself and exercise due to this.    Modified [place] Scale  Score: 0-No symptoms    Stroke Evaluation Summarized:  New results resulted and reviewed by me today are in BOLD below.      MRI/Head CT MRI brain shows no acute finding   Intracranial Vasculature CTA head normal   Cervical Vasculature CTA neck normal      Echocardiogram EF 55%, no cardiac source of embolism   EKG/Telemetry SR   Other Testing Cardiac event monitor: no atrial fibrillation     ESR: 8  CRP: <3.00      Labs Lab Results   Component Value Date     (H) 02/24/2025    A1C 5.8 (H) 02/24/2025    CTROPT <6 02/24/2025    INR 0.97 02/24/2025                 Home Medications     Current Outpatient Medications   Medication Sig Dispense Refill    albuterol (PROAIR HFA/PROVENTIL HFA/VENTOLIN HFA) 108 (90 Base) MCG/ACT inhaler Inhale 1-2 puffs into the lungs every 4 hours as needed for shortness of breath or wheezing 18 g 0    aspirin (ASA) 81 MG chewable tablet Take 81 mg by mouth three times a week.      aspirin 81 MG EC tablet Take 1  "tablet (81 mg) by mouth daily. 90 tablet 3    multivitamin w/minerals (MULTI-VITAMIN) tablet Take 1 tablet by mouth daily.      ondansetron (ZOFRAN ODT) 4 MG ODT tab Take 4 mg by mouth.      pantoprazole (PROTONIX) 40 MG EC tablet Take 40 mg by mouth daily.      SPIRIVA RESPIMAT 1.25 MCG/ACT inhaler Inhale 1 puff into the lungs daily      SYNTHROID 88 MCG tablet Take 88 mcg by mouth every morning (before breakfast).      VITAMIN D PO Take 1 capsule by mouth daily.       No current facility-administered medications for this visit.            Physical Examination     Vitals:  Vitals - Patient Reported  Systolic (Patient Reported): 122  Diastolic (Patient Reported): 80  Weight (Patient Reported): 55.3 kg (122 lb)  Height (Patient Reported): 160 cm (5' 3\")  BMI (Based on Pt Reported Ht/Wt): 21.61         BMI Readings from Last 1 Encounters:   02/25/25 21.82 kg/m        Neurologic: Completed via telemedicine video call  Mental Status:  alert, oriented x 3, speech clear and fluent  Cranial Nerves:  EOMI with normal smooth pursuit, facial movements symmetric, hearing not formally tested but intact to conversation, no dysarthria, shoulder shrug equal bilaterally, tongue protrusion midline  Motor:  no abnormal movements, able to move all limbs antigravity spontaneously with no signs of hemiparesis observed, no pronator drift         Screenings and Questionnaires:     Tobacco:    Tobacco Use      Smoking status: Never      Smokeless tobacco: Never      Sleep Apnea:       Depression:      3/6/2025    11:58 AM   PHQ-2 ( 1999 [employer])   Q1: Little interest or pleasure in doing things 0   Q2: Feeling down, depressed or hopeless 0   PHQ-2 Score 0       Stroke Recovery and Risk Factors:      3/27/2025     7:21 PM   Stroke Questionnaire   Residual effects: Any residual effects from stroke? I have some symptoms, but I'm not sure if they're residual effects of the stroke   Residual effects: Describe Exhaustion. I am care giver for my " .   Level of independence: Could you live alone? Yes   Quality of Life: Rating (0-100%) 80   Quality of Life: What work now or before stroke Retired   Quality of Life: Current work status Retired   Quality of Life: How do you get around Drive myself   Quality of Life: Living situation before stroke With family or significant other   Quality of Life: Living situation now With family or significant other   Medication: How do you take your meds Myself, from individual bottles   Medication: Do you ever miss or forget meds Rarely   Risk Factor: Checking blood pressure at home No, I have a blood pressure cuff but do not use it   Risk Factor: Why not checking BP at home Other   Risk Factor: Other reason not checking BP Rarely have blood pressure issues.   Risk Factor: Checking blood sugar at home No   Risk Factor: Second-hand smoke at home No   Risk Factor: How much caffeine per day 2-3 cups daily   Who completed this questionnaire? The patient independently           Assessment and Plan   Right amaurosis fugax, February 2025    Episode L sided headache; distribution sounds consistent with occipital neuralgia +/- some contribution from migraine     -Continue aspirin 81mg daily for stroke prevention  -Recommend Lipitor; she declines at this time  -Goal blood pressure is <130/80 with tighter control associated with improved vascular outcomes. Discussed starting home BP monitoring at least once weekly    -Second opinion scheduled at [place] later this month  -Continue to monitor headaches; if symptomatic worsening could consider preventative supplements/medications or trial of occipital nerve block  -Monitor for any emerging symptoms of temporal arteritis; current suspicion low  -May consider prolonged cardiac monitoring in future if etiology of amaurosis fugax is not identified     - Return in about 4 months (around 10/12/2025) for TIA, with [facility] only.     Stroke Education provided.  She will call us with any  questions.  For any acute neurologic deficits she was advised to  go directly to the hospital rather than call the clinic.      STEFFEN Lowery [facility]  Neurology  06/12/2025         ____________________________________________________________________    Billing:  Please note: for coding purposes this visit should be billed only as established and not new, since this patient was seen by my same subspecialty team ([facility]) during the hospitalization that this visit is a follow up for.  I spent a total of 50 minutes on the day of the visit.   Time spent by me today doing chart review, history and exam, documentation and further activities per the note

## 2025-06-12 NOTE — NURSING NOTE
Current patient location: [address] [facility] [address]    Is the patient currently in the state of [place]? YES    Visit mode:VIDEO    If the visit is dropped, the patient can be reconnected by: VIDEO VISIT: Text to cell phone:   Telephone Information:   Mobile [phone]       Will anyone else be joining the visit? NO  (If patient encounters technical issues they should call [phone])    How would you like to obtain your AVS? MyChart    Are changes needed to the allergy or medication list? Yes ASA 81 mg updated- states she takes it about 3 x weekly     Are refills needed on medications prescribed by this physician? NO    Reason for visit: Stroke    Corrine Connell MA

## 2025-06-23 ENCOUNTER — OFFICE VISIT (OUTPATIENT)
Dept: FAMILY MEDICINE | Facility: CLINIC | Age: 80
End: 2025-06-23
Payer: MEDICARE

## 2025-06-23 VITALS
SYSTOLIC BLOOD PRESSURE: 118 MMHG | HEIGHT: 63 IN | BODY MASS INDEX: 22.62 KG/M2 | WEIGHT: 127.7 LBS | TEMPERATURE: 97.6 F | RESPIRATION RATE: 12 BRPM | HEART RATE: 72 BPM | DIASTOLIC BLOOD PRESSURE: 68 MMHG | OXYGEN SATURATION: 98 %

## 2025-06-23 DIAGNOSIS — G45.9 TIA (TRANSIENT ISCHEMIC ATTACK): ICD-10-CM

## 2025-06-23 DIAGNOSIS — M81.0 AGE RELATED OSTEOPOROSIS, UNSPECIFIED PATHOLOGICAL FRACTURE PRESENCE: ICD-10-CM

## 2025-06-23 DIAGNOSIS — J45.30 MILD PERSISTENT ASTHMA, UNSPECIFIED WHETHER COMPLICATED: Primary | ICD-10-CM

## 2025-06-23 PROCEDURE — 1126F AMNT PAIN NOTED NONE PRSNT: CPT | Performed by: FAMILY MEDICINE

## 2025-06-23 PROCEDURE — 3078F DIAST BP <80 MM HG: CPT | Performed by: FAMILY MEDICINE

## 2025-06-23 PROCEDURE — 3074F SYST BP LT 130 MM HG: CPT | Performed by: FAMILY MEDICINE

## 2025-06-23 PROCEDURE — 99214 OFFICE O/P EST MOD 30 MIN: CPT | Performed by: FAMILY MEDICINE

## 2025-06-23 RX ORDER — ATORVASTATIN CALCIUM 10 MG/1
10 TABLET, FILM COATED ORAL DAILY
COMMUNITY
Start: 2025-04-22

## 2025-06-23 ASSESSMENT — PAIN SCALES - GENERAL: PAINLEVEL_OUTOF10: NO PAIN (0)

## 2025-06-23 NOTE — PROGRESS NOTES
Assessment & Plan   In clinic to establish care , following with the [facility] primary [facility] , stroke specialist , doing well .   Lives in [place] with  ,   Following with the stroke specialist for TIA episode .     (J45.30) Mild persistent asthma, unspecified whether complicated  (primary encounter diagnosis)  Comment: well controlled recommend to continue Spiriva and albuterol .      (M81.0) Age related osteoporosis, unspecified pathological fracture presence  Comment: we discussed treatment option , bone building exercises , 1200 mg calcium and 1000 units vitamin d .      TIA - Following with stroke specialist , reviewed testing at [place] .   Recommend to continue aspirin and statin .    Subjective   Kemi is a 80 year old, presenting for the following health issues:  Asthma (Follow up on asthma treatment. ), Establish Care (Pt here to establish care. Pt states she has a 30 day heart monitor, but she pt stztes she need the 3 year monitor and would like to have a provider reviewing that besides her cardiologist. ), and Thyroid Problem (Follow up on Low thyroid. )  History of Present Illness-  Kemi Wesley, 80-year-old female    -  had throat cancer, underwent radiation, and is currently being evaluated for emphysema.  - Underwent workup in February 2024, including blood tests and scans, followed by additional testing in March 2024.  - Scheduled for another test this week to check for the source of a blood clot.  - Had a temporal artery ultrasound scan to rule out temporal arteritis.  - Using a 30-day monitor for heart evaluation, with plans for a longer-term implant.  - Mild asthma, generally well-controlled, uses Spiriva about 3 days a week.  - Experienced weight loss during 's illness, regained some weight, but noted fluid retention when on feet in hot weather.  - Osteoporosis being monitored, no infusions received due to potential side effects.  - Hypothyroidism stable on levothyroxine,  with a history of fluctuating levels.  - Cervical radiculopathy with pain in the right arm, bone spur identified, received ultrasound-guided injection, and recommended for physical therapy.  - Carpal tunnel syndrome on the right, being monitored.  - History of migraines, monitored.  - Past skin cancer, had a flat mole removed on May 9, 2025, using silver nitrate.  - Cholesterol levels slightly elevated, no medication recommended initially.  - History of TIA, with negative MRI and MRA results, experiencing one-sided headaches since then.  - Coronary artery calcification noted in a CT chest scan with contrast in 2023.   1 Asthma Mild Intermittent (HCC)- on Spiriva   #2 Osteoporosis- monitored for now   #3  Hypothyroidism- on Levothyroxine   #4  Radiculopathy Cervical-  #5  Carpal Tunnel Syndrome Right  #6  Migraine Without Aura Not Intractable Without Status Migrainosus  #7 Transient Ischemic Attack  Other orders  History of Present Illness       Hypothyroidism:     Since last visit, patient describes the following symptoms::  Anxiety, Fatigue and Hair loss    She eats 2-3 servings of fruits and vegetables daily.She consumes 0 sweetened beverage(s) daily.She exercises with enough effort to increase her heart rate 20 to 29 minutes per day.  She exercises with enough effort to increase her heart rate 4 days per week.   She is taking medications regularly.            6/22/2025   Asthma   1.  In the past 4 weeks, how much of the time did your asthma keep you from getting as much done at work, school or at home? 4   2.  During the past 4 weeks, how often have you had shortness of breath? 4   3.  During the past 4 weeks, how often did your asthma symptoms (wheezing, coughing, shortness of breath, chest tightness or pain) wake you up at night or earlier than usual in the morning? 4   4.  During the past 4 weeks, how often have you used your rescue inhaler or nebulizer medication (such as albuterol)? 4   5.  How would you  "rate your asthma control during the past 4 weeks? 4   ACT TOTAL SCORE (Goal Greater than or Equal to 20) 20    In the past 12 months, how many times did you visit the emergency room for your asthma without being admitted to the hospital? 0   In the past 12 months, how many times were you hospitalized overnight because of your asthma? 0   Do you have a cough, wheezing or shortness of breath? None of these symptoms (Cough, Wheezing, Shortness of breath)   What makes your asthma/breathing worse? Smoke    Dust mites    Pollens    Mold    Humidity    Exercise or sports    Cold air   Do you want more information about how to use your inhaler? No       Patient-reported    Multiple values from one day are sorted in reverse-chronological order             Review of Systems  CONSTITUTIONAL: NEGATIVE for fever, chills, change in weight  ENT/MOUTH: NEGATIVE for ear, mouth and throat problems  RESP: NEGATIVE for significant cough or SOB  CV: NEGATIVE for chest pain, palpitations or peripheral edema      Objective    /68 (BP Location: Right arm, Patient Position: Sitting, Cuff Size: Adult Regular)   Pulse 72   Temp 97.6  F (36.4  C) (Oral)   Resp 12   Ht 1.6 m (5' 3\")   Wt 57.9 kg (127 lb 11.2 oz)   SpO2 98%   BMI 22.62 kg/m    Body mass index is 22.62 kg/m .  Physical Exam   GENERAL: alert and no distress  EYES: Eyes grossly normal to inspection, PERRL and conjunctivae and sclerae normal  HENT: ear canals and TM's normal, nose and mouth without ulcers or lesions  NECK: no adenopathy, no asymmetry, masses, or scars  RESP: lungs clear to auscultation - no rales, rhonchi or wheezes  CV: regular rate and rhythm, normal S1 S2, no S3 or S4, no murmur, click or rub, no peripheral edema  ABDOMEN: soft, nontender, no hepatosplenomegaly, no masses and bowel sounds normal  MS: no gross musculoskeletal defects noted, no edema  SKIN: no suspicious lesions or rashes  NEURO: Normal strength and tone, mentation intact and speech " normal  PSYCH: mentation appears normal, affect normal/bright        Signed Electronically by: Tahmina Puga MD

## 2025-06-24 ASSESSMENT — ASTHMA QUESTIONNAIRES: ACT_TOTALSCORE: 25

## 2025-07-15 ENCOUNTER — OFFICE VISIT (OUTPATIENT)
Dept: NEUROLOGY | Facility: CLINIC | Age: 80
End: 2025-07-15
Payer: MEDICARE

## 2025-07-15 ENCOUNTER — TELEPHONE (OUTPATIENT)
Dept: NEUROLOGY | Facility: CLINIC | Age: 80
End: 2025-07-15

## 2025-07-15 VITALS — SYSTOLIC BLOOD PRESSURE: 128 MMHG | OXYGEN SATURATION: 95 % | HEART RATE: 80 BPM | DIASTOLIC BLOOD PRESSURE: 80 MMHG

## 2025-07-15 DIAGNOSIS — M54.81 OCCIPITAL NEURALGIA OF LEFT SIDE: ICD-10-CM

## 2025-07-15 DIAGNOSIS — G45.3 AMAUROSIS FUGAX, RIGHT EYE: Primary | ICD-10-CM

## 2025-07-15 PROCEDURE — 3074F SYST BP LT 130 MM HG: CPT | Performed by: NURSE PRACTITIONER

## 2025-07-15 PROCEDURE — 99215 OFFICE O/P EST HI 40 MIN: CPT | Performed by: NURSE PRACTITIONER

## 2025-07-15 PROCEDURE — 99417 PROLNG OP E/M EACH 15 MIN: CPT | Performed by: NURSE PRACTITIONER

## 2025-07-15 PROCEDURE — 3079F DIAST BP 80-89 MM HG: CPT | Performed by: NURSE PRACTITIONER

## 2025-07-15 NOTE — TELEPHONE ENCOUNTER
Spoke with pt who states she recently followed up with neuro-ophthalmology who stated her results were normal? She is wanting to know what her next steps are in her care and doesn't want to wait until her October appt to determine those steps. Noted that this conversation may be better had as an OV and offered a 1:30pm appt today. Pt confirmed.       Mary Ann Dunbar BS, RN, SCRN  Stroke/Neurovascular Clinic RN  [facility] Neuroscience Service Line

## 2025-07-15 NOTE — PATIENT INSTRUCTIONS
-Continue aspirin 81mg for stroke prevention; I would recommend daily   -Recommend Lipitor; declines at this time  -Goal blood pressure is <130/80 with tighter control associated with improved vascular outcomes. 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

## 2025-07-15 NOTE — PROGRESS NOTES
__________________________________________________________      [facility]   [phone]  __________________________________________________________         History of Present Illness   Chief Complaint: No chief complaint on file.      Kemi Wesley is a 80 year old female presenting for follow-up for R amaurosis fugax.     She was hospitalized at [facility] on 2/24/25. Prior to the hospital stay, she had a past medical history of depression, migraine with visual aura and asthma. She presented to the hospital with transient R eye visual loss.  Stroke workup was unremarkable and she was diagnosed with amaurosis fugax.  She was treated with DAPT x 21 days, Lipitor 40 with recommendation for outpatient ophthalmology follow-up cardiac event monitoring.     Most recent stroke follow-up was with myself 6/12/2025.  At that time she denied any recurrent visual episodes, although she did continue to have intermittent left-sided headaches radiating from the surgical region forwards occurring about once per week but resolving with Aleve.  She also continued to endorse mild jaw pain which she attributed to clenching but denied any true jaw claudication with chewing or jaw movement.  It was recommended that she continue aspirin (trying to increase to daily if tolerated from a stomach standpoint).  She continued to decline Lipitor.  Planning for second opinion at [place] later in the month.    She was seen by [facility] ophthalmology 6/20/2025: She did not have any findings of giant cell arteritis on examination but given concern of jaw pain further workup including temporal artery ultrasound was recommended.  Ultrasound was normal on the right but on the left showed an area of possible  thickening; subsequent left temporal artery biopsy was negative for features of active arteritis.    Today, presents with her .  She reports that overall she has been feeling generally well since her  most recent visit together.  She continues to have some ongoing discomfort/soreness that starts in the left occipital region and radiates forward.  She feels this has been less intense since starting the prednisone but she does continue to have mild symptoms.  She also continues to have mild bilateral jaw soreness; she attributes this to clenching/grinding her jaw.  She denies any true jaw claudication; specifically she denies onset of jaw pain with prolonged chewing.  She denies fevers or myalgias.  As noted she has been on prednisone per [facility] but is now planning to taper off over the next week.    In regards to stroke prevention she continues on aspirin but is limiting this to 3 days/week.  She does not recall the recommendation to increase to daily.  She has been tolerating it well with no problems or concerns.  She continues to decline statin therapy.  She is scheduled to see cardiology next month for consideration of implantable loop recorder as etiology of her amaurosis fugax remains unclear.     Modified Maxine Scale  Score: 0-No symptoms    Stroke Evaluation Summarized:  New results resulted and reviewed by me today are in BOLD below.      MRI/Head CT MRI brain shows no acute finding   Intracranial Vasculature CTA head normal   Cervical Vasculature CTA neck normal      Echocardiogram EF 55%, no cardiac source of embolism   EKG/Telemetry SR   Other Testing Cardiac event monitor: no atrial fibrillation     ESR: 8  CRP: <3.00     Diagnostics at [facility]:  30-day cardiac event monitor no atrial fibrillation identified    Temporal artery ultrasound 6/26: Right temporal artery normal; left temporal artery with mild focal wall thickening of the left frontal branch    Temporal artery biopsy 7/7: Negative for active arteritis     Labs Lab Results   Component Value Date     (H) 02/24/2025    A1C 5.8 (H) 02/24/2025    CTROPT <6 02/24/2025    INR 0.97 02/24/2025                 Home Medications  "    Current Outpatient Medications   Medication Sig Dispense Refill    albuterol (PROAIR HFA/PROVENTIL HFA/VENTOLIN HFA) 108 (90 Base) MCG/ACT inhaler Inhale 1-2 puffs into the lungs every 4 hours as needed for shortness of breath or wheezing 18 g 0    aspirin 81 MG EC tablet Take 1 tablet (81 mg) by mouth daily. 90 tablet 3    atorvastatin (LIPITOR) 10 MG tablet Take 10 mg by mouth daily.      multivitamin w/minerals (MULTI-VITAMIN) tablet Take 1 tablet by mouth daily.      SPIRIVA RESPIMAT 1.25 MCG/ACT inhaler Inhale 1 puff into the lungs daily      SYNTHROID 88 MCG tablet Take 88 mcg by mouth every morning (before breakfast).      VITAMIN D PO Take 1 capsule by mouth daily.       No current facility-administered medications for this visit.            Physical Examination     Physical Exam    Estimated body mass index is 22.62 kg/m  as calculated from the following:    Height as of 6/23/25: 1.6 m (5' 3\").    Weight as of 6/23/25: 57.9 kg (127 lb 11.2 oz).    There were no vitals taken for this visit.       General Exam  General: Sitting up in chair in no acute distress  Pulmonary:  no respiratory distress    Neurologic:  Mental Status:  alert, oriented x 3, follows commands, speech clear and fluent  Cranial Nerves:  EOMI with normal smooth pursuit, facial sensation intact and symmetric, facial movements symmetric, hearing not formally tested but intact to conversation, no dysarthria, shoulder shrug strong bilaterally, tongue protrusion midline  Motor:  normal muscle tone and bulk, no abnormal movements, strength 5/5 throughout upper and lower extremities           Screenings and Questionnaires:     Tobacco:    Tobacco Use      Smoking status: Never      Smokeless tobacco: Never      Sleep Apnea:       Depression:      3/6/2025    11:58 AM   PHQ-2 ( 1999 [employer])   Q1: Little interest or pleasure in doing things 0   Q2: Feeling down, depressed or hopeless 0   PHQ-2 Score 0       Stroke Recovery and Risk Factors:     "  3/27/2025     7:21 PM   Stroke Questionnaire   Residual effects: Any residual effects from stroke? I have some symptoms, but I'm not sure if they're residual effects of the stroke   Residual effects: Describe Exhaustion. I am care giver for my .   Level of independence: Could you live alone? Yes   Quality of Life: Rating (0-100%) 80   Quality of Life: What work now or before stroke Retired   Quality of Life: Current work status Retired   Quality of Life: How do you get around Drive myself   Quality of Life: Living situation before stroke With family or significant other   Quality of Life: Living situation now With family or significant other   Medication: How do you take your meds Myself, from individual bottles   Medication: Do you ever miss or forget meds Rarely   Risk Factor: Checking blood pressure at home No, I have a blood pressure cuff but do not use it   Risk Factor: Why not checking BP at home Other   Risk Factor: Other reason not checking BP Rarely have blood pressure issues.   Risk Factor: Checking blood sugar at home No   Risk Factor: Second-hand smoke at home No   Risk Factor: How much caffeine per day 2-3 cups daily   Who completed this questionnaire? The patient independently             Assessment and Plan   Right amaurosis fugax, February 2025     Episode L sided headache; distribution sounds consistent with occipital neuralgia +/- some contribution from migraine     Additional workup at [place] including L temporal artery biopsy negative      -Continue aspirin 81mg daily for stroke prevention  -Recommend Lipitor; declines at this time  -Goal blood pressure is <130/80 with tighter control associated with improved vascular outcomes. 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  She will call us with any questions.  For any acute neurologic deficits she was advised to  go directly to the hospital rather than call the clinic.      STEFFEN Lowery [facility]  Neurology  07/15/2025         ____________________________________________________________________    Billing:  Please note: for coding purposes this visit should be billed only as established and not new, since this patient was seen by my same subspecialty team (ealth Vascular Neurology) during the hospitalization that this visit is a follow up for.  I spent a total of 56 minutes on the day of the visit.   Time spent by me today doing chart review, history and exam, documentation and further activities per the note

## 2025-07-15 NOTE — NURSING NOTE
Symptoms or concerns today: go over testing done at [facility], discuss plan of care    Med comments: none    Who the patient is here with today: Lenin,     Dimitri Willoughby MA

## 2025-07-15 NOTE — LETTER
7/15/2025      Kemi Wesley  [address] [facility] [address]      Dear Colleague,    Thank you for referring your patient, Kemi Wesley, to the [facility]. Please see a copy of my visit note below.    __________________________________________________________      [facility]   [phone]  __________________________________________________________         History of Present Illness   Chief Complaint: No chief complaint on file.      Kemi Wesley is a 80 year old female presenting for follow-up for R amaurosis fugax.     She was hospitalized at [facility] on 2/24/25. Prior to the hospital stay, she had a past medical history of depression, migraine with visual aura and asthma. She presented to the hospital with transient R eye visual loss.  Stroke workup was unremarkable and she was diagnosed with amaurosis fugax.  She was treated with DAPT x 21 days, Lipitor 40 with recommendation for outpatient ophthalmology follow-up cardiac event monitoring.     Most recent stroke follow-up was with myself 6/12/2025.  At that time she denied any recurrent visual episodes, although she did continue to have intermittent left-sided headaches radiating from the surgical region forwards occurring about once per week but resolving with Aleve.  She also continued to endorse mild jaw pain which she attributed to clenching but denied any true jaw claudication with chewing or jaw movement.  It was recommended that she continue aspirin (trying to increase to daily if tolerated from a stomach standpoint).  She continued to decline Lipitor.  Planning for second opinion at [place] later in the month.    She was seen by [facility] ophthalmology 6/20/2025: She did not have any findings of giant cell arteritis on examination but given concern of jaw pain further workup including temporal artery ultrasound was recommended.  Ultrasound was normal on the right  but on the left showed an area of possible  thickening; subsequent left temporal artery biopsy was negative for features of active arteritis.    Today, presents with her .  She reports that overall she has been feeling generally well since her most recent visit together.  She continues to have some ongoing discomfort/soreness that starts in the left occipital region and radiates forward.  She feels this has been less intense since starting the prednisone but she does continue to have mild symptoms.  She also continues to have mild bilateral jaw soreness; she attributes this to clenching/grinding her jaw.  She denies any true jaw claudication; specifically she denies onset of jaw pain with prolonged chewing.  She denies fevers or myalgias.  As noted she has been on prednisone per [facility] but is now planning to taper off over the next week.    In regards to stroke prevention she continues on aspirin but is limiting this to 3 days/week.  She does not recall the recommendation to increase to daily.  She has been tolerating it well with no problems or concerns.  She continues to decline statin therapy.  She is scheduled to see cardiology next month for consideration of implantable loop recorder as etiology of her amaurosis fugax remains unclear.     Modified Maxine Scale  Score: 0-No symptoms    Stroke Evaluation Summarized:  New results resulted and reviewed by me today are in BOLD below.      MRI/Head CT MRI brain shows no acute finding   Intracranial Vasculature CTA head normal   Cervical Vasculature CTA neck normal      Echocardiogram EF 55%, no cardiac source of embolism   EKG/Telemetry SR   Other Testing Cardiac event monitor: no atrial fibrillation     ESR: 8  CRP: <3.00     Diagnostics at [facility]:  30-day cardiac event monitor no atrial fibrillation identified    Temporal artery ultrasound 6/26: Right temporal artery normal; left temporal artery with mild focal wall thickening of the left frontal  "branch    Temporal artery biopsy 7/7: Negative for active arteritis     Labs Lab Results   Component Value Date     (H) 02/24/2025    A1C 5.8 (H) 02/24/2025    CTROPT <6 02/24/2025    INR 0.97 02/24/2025                 Home Medications     Current Outpatient Medications   Medication Sig Dispense Refill     albuterol (PROAIR HFA/PROVENTIL HFA/VENTOLIN HFA) 108 (90 Base) MCG/ACT inhaler Inhale 1-2 puffs into the lungs every 4 hours as needed for shortness of breath or wheezing 18 g 0     aspirin 81 MG EC tablet Take 1 tablet (81 mg) by mouth daily. 90 tablet 3     atorvastatin (LIPITOR) 10 MG tablet Take 10 mg by mouth daily.       multivitamin w/minerals (MULTI-VITAMIN) tablet Take 1 tablet by mouth daily.       SPIRIVA RESPIMAT 1.25 MCG/ACT inhaler Inhale 1 puff into the lungs daily       SYNTHROID 88 MCG tablet Take 88 mcg by mouth every morning (before breakfast).       VITAMIN D PO Take 1 capsule by mouth daily.       No current facility-administered medications for this visit.            Physical Examination     Physical Exam    Estimated body mass index is 22.62 kg/m  as calculated from the following:    Height as of 6/23/25: 1.6 m (5' 3\").    Weight as of 6/23/25: 57.9 kg (127 lb 11.2 oz).    There were no vitals taken for this visit.       General Exam  General: Sitting up in chair in no acute distress  Pulmonary:  no respiratory distress    Neurologic:  Mental Status:  alert, oriented x 3, follows commands, speech clear and fluent  Cranial Nerves:  EOMI with normal smooth pursuit, facial sensation intact and symmetric, facial movements symmetric, hearing not formally tested but intact to conversation, no dysarthria, shoulder shrug strong bilaterally, tongue protrusion midline  Motor:  normal muscle tone and bulk, no abnormal movements, strength 5/5 throughout upper and lower extremities           Screenings and Questionnaires:     Tobacco:    Tobacco Use      Smoking status: Never      Smokeless " tobacco: Never      Sleep Apnea:       Depression:      3/6/2025    11:58 AM   PHQ-2 ( 1999 [employer])   Q1: Little interest or pleasure in doing things 0   Q2: Feeling down, depressed or hopeless 0   PHQ-2 Score 0       Stroke Recovery and Risk Factors:      3/27/2025     7:21 PM   Stroke Questionnaire   Residual effects: Any residual effects from stroke? I have some symptoms, but I'm not sure if they're residual effects of the stroke   Residual effects: Describe Exhaustion. I am care giver for my .   Level of independence: Could you live alone? Yes   Quality of Life: Rating (0-100%) 80   Quality of Life: What work now or before stroke Retired   Quality of Life: Current work status Retired   Quality of Life: How do you get around Drive myself   Quality of Life: Living situation before stroke With family or significant other   Quality of Life: Living situation now With family or significant other   Medication: How do you take your meds Myself, from individual bottles   Medication: Do you ever miss or forget meds Rarely   Risk Factor: Checking blood pressure at home No, I have a blood pressure cuff but do not use it   Risk Factor: Why not checking BP at home Other   Risk Factor: Other reason not checking BP Rarely have blood pressure issues.   Risk Factor: Checking blood sugar at home No   Risk Factor: Second-hand smoke at home No   Risk Factor: How much caffeine per day 2-3 cups daily   Who completed this questionnaire? The patient independently             Assessment and Plan   Right amaurosis fugax, February 2025     Episode L sided headache; distribution sounds consistent with occipital neuralgia +/- some contribution from migraine     Additional workup at [place] including L temporal artery biopsy negative      -Continue aspirin 81mg daily for stroke prevention  -Recommend Lipitor; declines at this time  -Goal blood pressure is <130/80 with tighter control associated with improved vascular outcomes. 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  She will call us with any questions.  For any acute neurologic deficits she was advised to  go directly to the hospital rather than call the clinic.      STEFFEN Lowery CNP  Neurology  07/15/2025         ____________________________________________________________________    Billing:  Please note: for coding purposes this visit should be billed only as established and not new, since this patient was seen by my same [facility]ty team [facility]) during the hospitalization that this visit is a follow up for.  I spent a total of 56 minutes on the day of the visit.   Time spent by me today doing chart review, history and exam, documentation and further activities per the note          Again, thank you for allowing me to participate in the care of your patient.        Sincerely,        STEFFEN Lowery CNP    Electronically signed

## 2025-07-22 ENCOUNTER — THERAPY VISIT (OUTPATIENT)
Dept: PHYSICAL THERAPY | Facility: CLINIC | Age: 80
End: 2025-07-22
Attending: NURSE PRACTITIONER
Payer: MEDICARE

## 2025-07-22 DIAGNOSIS — M54.81 OCCIPITAL NEURALGIA OF LEFT SIDE: ICD-10-CM

## 2025-07-22 DIAGNOSIS — M54.2 CERVICAL PAIN: Primary | ICD-10-CM

## 2025-07-22 PROCEDURE — 97161 PT EVAL LOW COMPLEX 20 MIN: CPT | Mod: GP | Performed by: PHYSICAL THERAPIST

## 2025-07-22 PROCEDURE — 97110 THERAPEUTIC EXERCISES: CPT | Mod: GP | Performed by: PHYSICAL THERAPIST

## 2025-07-22 PROCEDURE — 97140 MANUAL THERAPY 1/> REGIONS: CPT | Mod: GP | Performed by: PHYSICAL THERAPIST

## 2025-07-22 NOTE — PROGRESS NOTES
PHYSICAL THERAPY EVALUATION  Type of Visit: Evaluation       Fall Risk Screen:  Have you fallen 2 or more times in the past year?: No  Have you fallen and had an injury in the past year?: No    Subjective     Pt describes intermittent left sided upper neck pain with pain into the left side of her head to the left temporal region.  Felt most days when she awakens in the morning and will usually resolve within a few hours.  Sometimes returns again later in the day for unknown reasons.  Heat seems to help.  Began about 6 months ago for unknown reasons.  Pt believes past hx of neck injury and excessive lifting and physical work from moving and caring for her  have contributed.          Presenting condition or subjective complaint: Pain left side head & neck  Date of onset: 07/15/25 (PT order)    Relevant medical history:     Dates & types of surgery:      Prior diagnostic imaging/testing results:       Prior therapy history for the same diagnosis, illness or injury: No      Prior Level of Function  Transfers: Independent  Ambulation: Independent  ADL: Independent  IADL:     Living Environment  Social support: With a significant other or spouse   Type of home: [facility]; 1 level   Stairs to enter the home: No       Ramp: No   Stairs inside the home: No       Help at home: None  Equipment owned:       Employment: No    Hobbies/Interests: Walking, family activities    Patient goals for therapy: Relieve stress/pressure head & neck    Pain assessment: See objective evaluation for additional pain details     Objective   CERVICAL SPINE EVALUATION  PAIN: Pain Level at Rest: 0/10  Pain Level with Use: 7/10  Pain Location: left upper neck, left occipital, temporal regions  Pain is Exacerbated By: unknown  Pain is Relieved By: heat  INTEGUMENTARY (edema, incisions): WNL  POSTURE: WNL  GAIT:   Weightbearing Status: WBAT  Assistive Device(s): None  Gait Deviations: WNL  BALANCE/PROPRIOCEPTION:   WEIGHTBEARING ALIGNMENT: mild  forward head  ROM:   (Degrees) Left AROM Right AROM    Cervical Flexion Full    Cervical Extension Mild loss    Cervical Side bend Mod loss with left neck pain Mod loss with tightness in left neck    Cervical Rotation Mild loss with left neck pain WNL    Cervical Protrusion     Cervical Retraction     Thoracic Flexion     Thoracic Extension     Thoracic Rotation       Left AROM Left PROM Right AROM Right PROM   Shoulder Flexion       Shoulder Extension       Shoulder Abduction       Shoulder Adduction       Shoulder IR       Shoulder ER       Shoulder Horiz Abduction       Shoulder Horiz Adduction       Pain:   End Feel:     MYOTOMES:   DTR S:   CORD SIGNS:   DERMATOMES:   NEURAL TENSION:   FLEXIBILITY:    SPECIAL TESTS:    Left Right   Alar Ligament Positive, negative   Cervical Flexion-Rotation Negative , positive Negative    Cervical Rot/Lateral Flex Positive Negative    Compression Negative  Negative    Distraction Negative  Negative    Spurling s Positive Negative    Thoracic Outlet Screen (Keith, Adson)     Transverse Ligament     Vertebral Artery     Cotton Roll Test     Craniocervical Flexor Endurance Test     Mannheimer Test            PALPATION: mild hypertonicity of the SO's with mild tenderness on the left  SPINAL SEGMENTAL CONCLUSIONS:       Assessment & Plan   CLINICAL IMPRESSIONS  Medical Diagnosis: Occipital neuralgia of left side    Treatment Diagnosis: Neck pain   Impression/Assessment: Patient is a 80 year old female with neck complaints.  The following significant findings have been identified: Pain and Decreased ROM/flexibility. These impairments interfere with their ability to perform recreational activities, household chores, and driving  as compared to previous level of function.     Clinical Decision Making (Complexity):  Clinical Presentation: Stable/Uncomplicated  Clinical Presentation Rationale: based on medical and personal factors listed in PT evaluation  Clinical Decision Making  (Complexity): Low complexity    PLAN OF CARE  Treatment Interventions:  Modalities: Hot Pack  Interventions: Manual Therapy, Therapeutic Exercise    Long Term Goals     PT Goal 1  Goal Identifier: Sleeping  Goal Description: Sleep without awakening with neck pain  Rationale: to maximize safety and independence with performance of ADLs and functional tasks  Target Date: 09/02/25      Frequency of Treatment: 1x/week  Duration of Treatment: 8 weeks    Recommended Referrals to Other Professionals:   Education Assessment:        Risks and benefits of evaluation/treatment have been explained.   Patient/Family/caregiver agrees with Plan of Care.     Evaluation Time:     PT Eval, Low Complexity Minutes (30475): 20       Signing Clinician: TONNY Sarabia [facility]                                                                                   OUTPATIENT PHYSICAL THERAPY      PLAN OF TREATMENT FOR OUTPATIENT REHABILITATION   Patient's Last Name, First Name, Kemi Crowe YOB: 1945   Provider's Name   [facility]   Medical Record No.  [id number]     Onset Date: 07/15/25 (PT order)  Start of Care Date: 07/22/25     Medical Diagnosis:  Occipital neuralgia of left side      PT Treatment Diagnosis:  Neck pain Plan of Treatment  Frequency/Duration: 1x/week/ 8 weeks    Certification date from 07/22/25 to 09/15/25         See note for plan of treatment details and functional goals     Devang Barillas, PT                         I CERTIFY THE NEED FOR THESE SERVICES FURNISHED UNDER        THIS PLAN OF TREATMENT AND WHILE UNDER MY CARE     (Physician attestation of this document indicates review and certification of the therapy plan).              Referring Provider:  Marium Jones    Initial Assessment  See Epic Evaluation- Start of Care Date: 07/22/25

## 2025-08-13 ENCOUNTER — TELEPHONE (OUTPATIENT)
Dept: CARDIOLOGY | Facility: CLINIC | Age: 80
End: 2025-08-13
Payer: MEDICARE

## 2025-08-19 ENCOUNTER — THERAPY VISIT (OUTPATIENT)
Dept: PHYSICAL THERAPY | Facility: CLINIC | Age: 80
End: 2025-08-19
Payer: MEDICARE

## 2025-08-19 ENCOUNTER — TELEPHONE (OUTPATIENT)
Dept: CARDIOLOGY | Facility: CLINIC | Age: 80
End: 2025-08-19

## 2025-08-19 DIAGNOSIS — M54.2 CERVICAL PAIN: Primary | ICD-10-CM

## 2025-08-19 PROCEDURE — 97010 HOT OR COLD PACKS THERAPY: CPT | Mod: GP | Performed by: PHYSICAL THERAPIST

## 2025-08-19 PROCEDURE — 97140 MANUAL THERAPY 1/> REGIONS: CPT | Mod: GP | Performed by: PHYSICAL THERAPIST

## 2025-08-19 PROCEDURE — 97110 THERAPEUTIC EXERCISES: CPT | Mod: GP | Performed by: PHYSICAL THERAPIST

## 2025-08-22 ENCOUNTER — HOSPITAL ENCOUNTER (OUTPATIENT)
Dept: CARDIOLOGY | Facility: CLINIC | Age: 80
Discharge: HOME | End: 2025-08-22
Attending: INTERNAL MEDICINE | Admitting: INTERNAL MEDICINE
Payer: MEDICARE

## 2025-08-22 DIAGNOSIS — I25.10 CORONARY ARTERY CALCIFICATION: ICD-10-CM

## 2025-08-22 PROCEDURE — 93350 STRESS TTE ONLY: CPT | Mod: 26 | Performed by: INTERNAL MEDICINE

## 2025-08-22 PROCEDURE — 93325 DOPPLER ECHO COLOR FLOW MAPG: CPT | Mod: TC

## 2025-08-22 PROCEDURE — 93016 CV STRESS TEST SUPVJ ONLY: CPT | Performed by: INTERNAL MEDICINE

## 2025-08-22 PROCEDURE — 93018 CV STRESS TEST I&R ONLY: CPT | Performed by: INTERNAL MEDICINE

## 2025-08-22 PROCEDURE — 93321 DOPPLER ECHO F-UP/LMTD STD: CPT | Mod: 26 | Performed by: INTERNAL MEDICINE

## 2025-08-22 PROCEDURE — 93325 DOPPLER ECHO COLOR FLOW MAPG: CPT | Mod: 26 | Performed by: INTERNAL MEDICINE

## 2025-09-02 ENCOUNTER — TELEPHONE (OUTPATIENT)
Dept: CARE COORDINATION | Facility: CLINIC | Age: 80
End: 2025-09-02
Payer: MEDICARE

## 2025-09-03 ENCOUNTER — TELEPHONE (OUTPATIENT)
Dept: CARDIOLOGY | Facility: CLINIC | Age: 80
End: 2025-09-03
Payer: MEDICARE

## 2025-09-03 DIAGNOSIS — G45.9 TIA (TRANSIENT ISCHEMIC ATTACK): Primary | ICD-10-CM

## 2025-09-04 DIAGNOSIS — G45.9 TIA (TRANSIENT ISCHEMIC ATTACK): Primary | ICD-10-CM

## 2025-09-04 DIAGNOSIS — Z45.09 ENCOUNTER FOR LOOP RECORDER CHECK: ICD-10-CM
